# Patient Record
Sex: MALE | Race: WHITE | Employment: FULL TIME | URBAN - METROPOLITAN AREA
[De-identification: names, ages, dates, MRNs, and addresses within clinical notes are randomized per-mention and may not be internally consistent; named-entity substitution may affect disease eponyms.]

---

## 2022-01-28 ENCOUNTER — TRANSCRIBE ORDER (OUTPATIENT)
Dept: SCHEDULING | Age: 52
End: 2022-01-28

## 2022-01-28 DIAGNOSIS — M54.14 THORACIC RADICULOPATHY: Primary | ICD-10-CM

## 2022-01-28 DIAGNOSIS — M62.81 MUSCLE WEAKNESS (GENERALIZED): ICD-10-CM

## 2022-02-17 ENCOUNTER — HOSPITAL ENCOUNTER (OUTPATIENT)
Dept: MRI IMAGING | Age: 52
Discharge: HOME | End: 2022-02-17
Attending: FAMILY MEDICINE
Payer: COMMERCIAL

## 2022-02-17 DIAGNOSIS — M62.81 MUSCLE WEAKNESS (GENERALIZED): ICD-10-CM

## 2022-02-17 DIAGNOSIS — M54.14 THORACIC RADICULOPATHY: ICD-10-CM

## 2022-02-17 PROCEDURE — 72141 MRI NECK SPINE W/O DYE: CPT

## 2024-02-07 ENCOUNTER — APPOINTMENT (OUTPATIENT)
Facility: HOSPITAL | Age: 54
End: 2024-02-07

## 2024-02-07 ENCOUNTER — HOSPITAL ENCOUNTER (OUTPATIENT)
Facility: HOSPITAL | Age: 54
Setting detail: OBSERVATION
Discharge: HOME | End: 2024-02-07
Attending: STUDENT IN AN ORGANIZED HEALTH CARE EDUCATION/TRAINING PROGRAM | Admitting: FAMILY MEDICINE

## 2024-02-07 VITALS
BODY MASS INDEX: 22.82 KG/M2 | TEMPERATURE: 98.1 F | OXYGEN SATURATION: 97 % | HEIGHT: 71 IN | RESPIRATION RATE: 13 BRPM | SYSTOLIC BLOOD PRESSURE: 137 MMHG | DIASTOLIC BLOOD PRESSURE: 80 MMHG | HEART RATE: 73 BPM | WEIGHT: 163 LBS

## 2024-02-07 DIAGNOSIS — R79.89 ELEVATED TROPONIN: ICD-10-CM

## 2024-02-07 DIAGNOSIS — I20.89 OTHER FORMS OF ANGINA PECTORIS: Primary | ICD-10-CM

## 2024-02-07 DIAGNOSIS — I21.4 NSTEMI (NON-ST ELEVATED MYOCARDIAL INFARCTION) (HCC): ICD-10-CM

## 2024-02-07 DIAGNOSIS — I15.8 OTHER SECONDARY HYPERTENSION: ICD-10-CM

## 2024-02-07 PROBLEM — R07.9 CHEST PAIN: Status: ACTIVE | Noted: 2024-02-07

## 2024-02-07 LAB
ANION GAP SERPL CALC-SCNC: 4 MMOL/L (ref 5–15)
BASOPHILS # BLD: 0 K/UL (ref 0–0.1)
BASOPHILS NFR BLD: 1 % (ref 0–1)
BUN SERPL-MCNC: 11 MG/DL (ref 6–20)
BUN/CREAT SERPL: 8 (ref 12–20)
CALCIUM SERPL-MCNC: 9.5 MG/DL (ref 8.5–10.1)
CHLORIDE SERPL-SCNC: 107 MMOL/L (ref 97–108)
CO2 SERPL-SCNC: 26 MMOL/L (ref 21–32)
COMMENT:: NORMAL
CREAT SERPL-MCNC: 1.31 MG/DL (ref 0.7–1.3)
DIFFERENTIAL METHOD BLD: NORMAL
ECHO BSA: 1.92 M2
EOSINOPHIL # BLD: 0 K/UL (ref 0–0.4)
EOSINOPHIL NFR BLD: 1 % (ref 0–7)
ERYTHROCYTE [DISTWIDTH] IN BLOOD BY AUTOMATED COUNT: 12.9 % (ref 11.5–14.5)
GLUCOSE SERPL-MCNC: 93 MG/DL (ref 65–100)
HCT VFR BLD AUTO: 41.9 % (ref 36.6–50.3)
HGB BLD-MCNC: 14.5 G/DL (ref 12.1–17)
IMM GRANULOCYTES # BLD AUTO: 0 K/UL (ref 0–0.04)
IMM GRANULOCYTES NFR BLD AUTO: 0 % (ref 0–0.5)
LYMPHOCYTES # BLD: 2 K/UL (ref 0.8–3.5)
LYMPHOCYTES NFR BLD: 32 % (ref 12–49)
MCH RBC QN AUTO: 31.2 PG (ref 26–34)
MCHC RBC AUTO-ENTMCNC: 34.6 G/DL (ref 30–36.5)
MCV RBC AUTO: 90.1 FL (ref 80–99)
MONOCYTES # BLD: 0.5 K/UL (ref 0–1)
MONOCYTES NFR BLD: 8 % (ref 5–13)
NEUTS SEG # BLD: 3.7 K/UL (ref 1.8–8)
NEUTS SEG NFR BLD: 58 % (ref 32–75)
NRBC # BLD: 0 K/UL (ref 0–0.01)
NRBC BLD-RTO: 0 PER 100 WBC
PLATELET # BLD AUTO: 217 K/UL (ref 150–400)
PMV BLD AUTO: 11.9 FL (ref 8.9–12.9)
POTASSIUM SERPL-SCNC: 5.1 MMOL/L (ref 3.5–5.1)
RBC # BLD AUTO: 4.65 M/UL (ref 4.1–5.7)
SODIUM SERPL-SCNC: 137 MMOL/L (ref 136–145)
SPECIMEN HOLD: NORMAL
TROPONIN I SERPL HS-MCNC: 105 NG/L (ref 0–76)
TROPONIN I SERPL HS-MCNC: 37 NG/L (ref 0–76)
WBC # BLD AUTO: 6.3 K/UL (ref 4.1–11.1)

## 2024-02-07 PROCEDURE — 80048 BASIC METABOLIC PNL TOTAL CA: CPT

## 2024-02-07 PROCEDURE — 99285 EMERGENCY DEPT VISIT HI MDM: CPT

## 2024-02-07 PROCEDURE — 2580000003 HC RX 258: Performed by: INTERNAL MEDICINE

## 2024-02-07 PROCEDURE — 96375 TX/PRO/DX INJ NEW DRUG ADDON: CPT

## 2024-02-07 PROCEDURE — 6360000002 HC RX W HCPCS: Performed by: STUDENT IN AN ORGANIZED HEALTH CARE EDUCATION/TRAINING PROGRAM

## 2024-02-07 PROCEDURE — 71045 X-RAY EXAM CHEST 1 VIEW: CPT

## 2024-02-07 PROCEDURE — 99152 MOD SED SAME PHYS/QHP 5/>YRS: CPT | Performed by: INTERNAL MEDICINE

## 2024-02-07 PROCEDURE — 96374 THER/PROPH/DIAG INJ IV PUSH: CPT

## 2024-02-07 PROCEDURE — 36415 COLL VENOUS BLD VENIPUNCTURE: CPT

## 2024-02-07 PROCEDURE — G0378 HOSPITAL OBSERVATION PER HR: HCPCS

## 2024-02-07 PROCEDURE — 93454 CORONARY ARTERY ANGIO S&I: CPT | Performed by: INTERNAL MEDICINE

## 2024-02-07 PROCEDURE — 6360000004 HC RX CONTRAST MEDICATION: Performed by: INTERNAL MEDICINE

## 2024-02-07 PROCEDURE — 84484 ASSAY OF TROPONIN QUANT: CPT

## 2024-02-07 PROCEDURE — C1713 ANCHOR/SCREW BN/BN,TIS/BN: HCPCS | Performed by: INTERNAL MEDICINE

## 2024-02-07 PROCEDURE — 2500000003 HC RX 250 WO HCPCS: Performed by: INTERNAL MEDICINE

## 2024-02-07 PROCEDURE — 2709999900 HC NON-CHARGEABLE SUPPLY: Performed by: INTERNAL MEDICINE

## 2024-02-07 PROCEDURE — C1894 INTRO/SHEATH, NON-LASER: HCPCS | Performed by: INTERNAL MEDICINE

## 2024-02-07 PROCEDURE — 93005 ELECTROCARDIOGRAM TRACING: CPT | Performed by: STUDENT IN AN ORGANIZED HEALTH CARE EDUCATION/TRAINING PROGRAM

## 2024-02-07 PROCEDURE — 85025 COMPLETE CBC W/AUTO DIFF WBC: CPT

## 2024-02-07 PROCEDURE — 6360000002 HC RX W HCPCS: Performed by: INTERNAL MEDICINE

## 2024-02-07 RX ORDER — VERAPAMIL HYDROCHLORIDE 2.5 MG/ML
INJECTION, SOLUTION INTRAVENOUS PRN
Status: DISCONTINUED | OUTPATIENT
Start: 2024-02-07 | End: 2024-02-07 | Stop reason: HOSPADM

## 2024-02-07 RX ORDER — ONDANSETRON 4 MG/1
4 TABLET, ORALLY DISINTEGRATING ORAL EVERY 8 HOURS PRN
Status: DISCONTINUED | OUTPATIENT
Start: 2024-02-07 | End: 2024-02-07 | Stop reason: HOSPADM

## 2024-02-07 RX ORDER — KETOROLAC TROMETHAMINE 30 MG/ML
30 INJECTION, SOLUTION INTRAMUSCULAR; INTRAVENOUS ONCE
Status: COMPLETED | OUTPATIENT
Start: 2024-02-07 | End: 2024-02-07

## 2024-02-07 RX ORDER — HEPARIN SODIUM 1000 [USP'U]/ML
60 INJECTION, SOLUTION INTRAVENOUS; SUBCUTANEOUS ONCE
Status: DISCONTINUED | OUTPATIENT
Start: 2024-02-07 | End: 2024-02-07 | Stop reason: HOSPADM

## 2024-02-07 RX ORDER — MORPHINE SULFATE 2 MG/ML
2 INJECTION, SOLUTION INTRAMUSCULAR; INTRAVENOUS EVERY 4 HOURS PRN
Status: DISCONTINUED | OUTPATIENT
Start: 2024-02-07 | End: 2024-02-07 | Stop reason: HOSPADM

## 2024-02-07 RX ORDER — OMEPRAZOLE 10 MG/1
10 CAPSULE, DELAYED RELEASE ORAL
COMMUNITY

## 2024-02-07 RX ORDER — MIDAZOLAM HYDROCHLORIDE 1 MG/ML
INJECTION INTRAMUSCULAR; INTRAVENOUS PRN
Status: DISCONTINUED | OUTPATIENT
Start: 2024-02-07 | End: 2024-02-07 | Stop reason: HOSPADM

## 2024-02-07 RX ORDER — HEPARIN SODIUM 1000 [USP'U]/ML
INJECTION, SOLUTION INTRAVENOUS; SUBCUTANEOUS PRN
Status: DISCONTINUED | OUTPATIENT
Start: 2024-02-07 | End: 2024-02-07 | Stop reason: HOSPADM

## 2024-02-07 RX ORDER — SODIUM CHLORIDE 0.9 % (FLUSH) 0.9 %
5-40 SYRINGE (ML) INJECTION EVERY 12 HOURS SCHEDULED
Status: DISCONTINUED | OUTPATIENT
Start: 2024-02-07 | End: 2024-02-07 | Stop reason: HOSPADM

## 2024-02-07 RX ORDER — ACETAMINOPHEN 650 MG/1
650 SUPPOSITORY RECTAL EVERY 6 HOURS PRN
Status: DISCONTINUED | OUTPATIENT
Start: 2024-02-07 | End: 2024-02-07 | Stop reason: HOSPADM

## 2024-02-07 RX ORDER — SODIUM CHLORIDE 9 MG/ML
INJECTION, SOLUTION INTRAVENOUS CONTINUOUS PRN
Status: COMPLETED | OUTPATIENT
Start: 2024-02-07 | End: 2024-02-07

## 2024-02-07 RX ORDER — POTASSIUM CHLORIDE 750 MG/1
40 TABLET, FILM COATED, EXTENDED RELEASE ORAL PRN
Status: DISCONTINUED | OUTPATIENT
Start: 2024-02-07 | End: 2024-02-07 | Stop reason: HOSPADM

## 2024-02-07 RX ORDER — POTASSIUM CHLORIDE 7.45 MG/ML
10 INJECTION INTRAVENOUS PRN
Status: DISCONTINUED | OUTPATIENT
Start: 2024-02-07 | End: 2024-02-07 | Stop reason: HOSPADM

## 2024-02-07 RX ORDER — HEPARIN SODIUM 10000 [USP'U]/100ML
5-30 INJECTION, SOLUTION INTRAVENOUS CONTINUOUS
Status: DISCONTINUED | OUTPATIENT
Start: 2024-02-07 | End: 2024-02-07

## 2024-02-07 RX ORDER — HEPARIN SODIUM 200 [USP'U]/100ML
INJECTION, SOLUTION INTRAVENOUS CONTINUOUS PRN
Status: COMPLETED | OUTPATIENT
Start: 2024-02-07 | End: 2024-02-07

## 2024-02-07 RX ORDER — SODIUM CHLORIDE 9 MG/ML
INJECTION, SOLUTION INTRAVENOUS PRN
Status: DISCONTINUED | OUTPATIENT
Start: 2024-02-07 | End: 2024-02-07 | Stop reason: HOSPADM

## 2024-02-07 RX ORDER — MORPHINE SULFATE 4 MG/ML
4 INJECTION, SOLUTION INTRAMUSCULAR; INTRAVENOUS
Status: COMPLETED | OUTPATIENT
Start: 2024-02-07 | End: 2024-02-07

## 2024-02-07 RX ORDER — FENTANYL CITRATE 50 UG/ML
INJECTION, SOLUTION INTRAMUSCULAR; INTRAVENOUS PRN
Status: DISCONTINUED | OUTPATIENT
Start: 2024-02-07 | End: 2024-02-07 | Stop reason: HOSPADM

## 2024-02-07 RX ORDER — POLYETHYLENE GLYCOL 3350 17 G/17G
17 POWDER, FOR SOLUTION ORAL DAILY PRN
Status: DISCONTINUED | OUTPATIENT
Start: 2024-02-07 | End: 2024-02-07 | Stop reason: HOSPADM

## 2024-02-07 RX ORDER — NICOTINE 21 MG/24HR
1 PATCH, TRANSDERMAL 24 HOURS TRANSDERMAL DAILY
Status: DISCONTINUED | OUTPATIENT
Start: 2024-02-07 | End: 2024-02-07 | Stop reason: HOSPADM

## 2024-02-07 RX ORDER — ONDANSETRON 2 MG/ML
4 INJECTION INTRAMUSCULAR; INTRAVENOUS EVERY 6 HOURS PRN
Status: DISCONTINUED | OUTPATIENT
Start: 2024-02-07 | End: 2024-02-07 | Stop reason: HOSPADM

## 2024-02-07 RX ORDER — MAGNESIUM SULFATE IN WATER 40 MG/ML
2000 INJECTION, SOLUTION INTRAVENOUS PRN
Status: DISCONTINUED | OUTPATIENT
Start: 2024-02-07 | End: 2024-02-07 | Stop reason: HOSPADM

## 2024-02-07 RX ORDER — ACETAMINOPHEN 325 MG/1
650 TABLET ORAL EVERY 6 HOURS PRN
Status: DISCONTINUED | OUTPATIENT
Start: 2024-02-07 | End: 2024-02-07 | Stop reason: HOSPADM

## 2024-02-07 RX ORDER — HEPARIN SODIUM 1000 [USP'U]/ML
30 INJECTION, SOLUTION INTRAVENOUS; SUBCUTANEOUS PRN
Status: DISCONTINUED | OUTPATIENT
Start: 2024-02-07 | End: 2024-02-07

## 2024-02-07 RX ORDER — ASPIRIN 81 MG/1
243 TABLET, CHEWABLE ORAL ONCE
Status: DISCONTINUED | OUTPATIENT
Start: 2024-02-07 | End: 2024-02-07 | Stop reason: HOSPADM

## 2024-02-07 RX ORDER — ACETAMINOPHEN 325 MG/1
650 TABLET ORAL EVERY 4 HOURS PRN
Status: DISCONTINUED | OUTPATIENT
Start: 2024-02-07 | End: 2024-02-07 | Stop reason: HOSPADM

## 2024-02-07 RX ORDER — SODIUM CHLORIDE 0.9 % (FLUSH) 0.9 %
5-40 SYRINGE (ML) INJECTION PRN
Status: DISCONTINUED | OUTPATIENT
Start: 2024-02-07 | End: 2024-02-07 | Stop reason: HOSPADM

## 2024-02-07 RX ORDER — LIDOCAINE HYDROCHLORIDE 10 MG/ML
INJECTION, SOLUTION INFILTRATION; PERINEURAL PRN
Status: DISCONTINUED | OUTPATIENT
Start: 2024-02-07 | End: 2024-02-07 | Stop reason: HOSPADM

## 2024-02-07 RX ORDER — HEPARIN SODIUM 1000 [USP'U]/ML
60 INJECTION, SOLUTION INTRAVENOUS; SUBCUTANEOUS PRN
Status: DISCONTINUED | OUTPATIENT
Start: 2024-02-07 | End: 2024-02-07

## 2024-02-07 RX ADMIN — KETOROLAC TROMETHAMINE 30 MG: 30 INJECTION INTRAMUSCULAR; INTRAVENOUS at 08:51

## 2024-02-07 RX ADMIN — MORPHINE SULFATE 4 MG: 4 INJECTION, SOLUTION INTRAMUSCULAR; INTRAVENOUS at 08:50

## 2024-02-07 ASSESSMENT — PAIN SCALES - GENERAL
PAINLEVEL_OUTOF10: 4
PAINLEVEL_OUTOF10: 10
PAINLEVEL_OUTOF10: 6
PAINLEVEL_OUTOF10: 10
PAINLEVEL_OUTOF10: 3
PAINLEVEL_OUTOF10: 10

## 2024-02-07 ASSESSMENT — PAIN - FUNCTIONAL ASSESSMENT
PAIN_FUNCTIONAL_ASSESSMENT: 0-10
PAIN_FUNCTIONAL_ASSESSMENT: PREVENTS OR INTERFERES SOME ACTIVE ACTIVITIES AND ADLS

## 2024-02-07 ASSESSMENT — PAIN DESCRIPTION - LOCATION
LOCATION: CHEST

## 2024-02-07 ASSESSMENT — PAIN DESCRIPTION - ORIENTATION
ORIENTATION: LEFT;UPPER;ANTERIOR
ORIENTATION: LEFT;UPPER;ANTERIOR
ORIENTATION: LEFT

## 2024-02-07 ASSESSMENT — LIFESTYLE VARIABLES
HOW OFTEN DO YOU HAVE A DRINK CONTAINING ALCOHOL: 2-3 TIMES A WEEK
HOW MANY STANDARD DRINKS CONTAINING ALCOHOL DO YOU HAVE ON A TYPICAL DAY: 1 OR 2
HOW OFTEN DO YOU HAVE A DRINK CONTAINING ALCOHOL: 2-3 TIMES A WEEK
HOW MANY STANDARD DRINKS CONTAINING ALCOHOL DO YOU HAVE ON A TYPICAL DAY: 1 OR 2

## 2024-02-07 ASSESSMENT — PAIN DESCRIPTION - PAIN TYPE
TYPE: ACUTE PAIN
TYPE: ACUTE PAIN

## 2024-02-07 ASSESSMENT — PAIN DESCRIPTION - FREQUENCY
FREQUENCY: INTERMITTENT
FREQUENCY: INTERMITTENT

## 2024-02-07 ASSESSMENT — ENCOUNTER SYMPTOMS
NAUSEA: 0
RHINORRHEA: 0
VOMITING: 0
ABDOMINAL PAIN: 0
TROUBLE SWALLOWING: 0
SHORTNESS OF BREATH: 0
BACK PAIN: 0

## 2024-02-07 ASSESSMENT — PAIN DESCRIPTION - DESCRIPTORS
DESCRIPTORS: SHARP
DESCRIPTORS: STABBING
DESCRIPTORS: STABBING

## 2024-02-07 NOTE — H&P
History and Physical    Date of Service:  2/7/2024  Primary Care Provider: Miguelito Hung MD  Source of information: patient, electronic medical record    Chief Complaint: Chest Pain (Left upper)      History of Presenting Illness:   Avery Mccartney is a 53 y.o. male with a past medical history significant for pneumothorax and subsequent episodes of pleurisy.  Patient presented to the emergency department at our facility on 2/7/2024 with complaints of chest pain which started in the morning.  This was described as sharp stabbing pain, constant, associated with diaphoresis.  Workup in the emergency department included initial troponin of 37 however follow-up was elevated at 105.  EKG did not reveal any ischemic changes, chest radiograph was unremarkable.  Patient was ordered to the hospitalist service for further evaluation and consult was placed to cardiology.  At the moment of the initial interview, with some discomfort however has received morphine.  Vital signs stable, 117/81 with heart rate of 66 variations 15.  Saturation is 96% on room air           REVIEW OF SYSTEMS:  A comprehensive review of systems was negative except for that written in the History of Present Illness.     Past Medical History:   Diagnosis Date    GERD (gastroesophageal reflux disease)     Heat stroke 2010    Left groin pain 9/4/2012    Pleurisy 2002    Pneumothorax on right 2004    Psychiatric disorder 1982    ADHD    Recurrent left inguinal hernia 8/28/2012    Seizures (HCC)       Past Surgical History:   Procedure Laterality Date    HERNIA REPAIR  2012    ORTHOPEDIC SURGERY  2012    RIGHT MCL REPAIR     Prior to Admission medications    Medication Sig Start Date End Date Taking? Authorizing Provider   omeprazole (PRILOSEC) 10 MG delayed release capsule Take 1 capsule by mouth nightly GERD   Yes Provider, Raul, MD     Allergies   Allergen Reactions    Hydrocodone Nausea And Vomiting      Family History   Problem Relation

## 2024-02-07 NOTE — PROGRESS NOTES
Admission Medication Reconciliation:    Information obtained from:  Patient  RxQuery data available¹:  Yes    Comments/Recommendations: Updated PTA meds/reviewed patient's allergies.    1)  Patient reports only taking OTC omeprazole 10 mg QHS. Last dose was last night 2/06.    2)  Medication changes (since last review): None    3)  Confirmed allergies. N/V to hydrocodone.     ¹RxQuery pharmacy benefit data reflects medications filled and processed through the patient's insurance, however   this data does NOT capture whether the medication was picked up or is currently being taken by the patient.    Allergies:  Hydrocodone    Significant PMH/Disease States:   Past Medical History:   Diagnosis Date    GERD (gastroesophageal reflux disease)     Heat stroke 2010    Left groin pain 9/4/2012    Pleurisy 2002    Pneumothorax on right 2004    Psychiatric disorder 1982    ADHD    Recurrent left inguinal hernia 8/28/2012    Seizures (HCC)      Chief Complaint for this Admission:    Chief Complaint   Patient presents with    Chest Pain     Left upper     Prior to Admission Medications:   Prior to Admission Medications   Prescriptions Last Dose Informant   omeprazole (PRILOSEC) 10 MG delayed release capsule 2/6/2024    Sig: Take 1 capsule by mouth nightly GERD      Facility-Administered Medications: None     Please contact the main inpatient pharmacy with any questions or concerns at [phone] and we will direct you to the clinical pharmacist covering this patient's care while in-house.   Sanket Chacon, PharmD  Clinical Pharmacist   done

## 2024-02-07 NOTE — ED TRIAGE NOTES
BIBA coming from [facility] in [place] where the patient works.     C/O CP - left upper area, intermittent 4-10/10, stabbing pain, with SOB when pain hits - per patient. Originally diaphoretic when EMS arrived.       Denies nausea or vomiting, flu sx.    A&O x 4, ambulates well,    Hx per chart: seizures( pt states, was shaking with cold) GERD, psychiatric disorder, skin cancer right ear canal. Pleurisy,   Recurrent L. Inguinal hernia.

## 2024-02-07 NOTE — PROGRESS NOTES
TRANSFER - IN REPORT:    Verbal report received from REGI De Dios on Avery Mccartney  being received from [facility] for routine progression of care. Report consisted of patient’s Situation, Background, Assessment and Recommendations(SBAR). Information from the following report(s) procedure summary, MAR was reviewed with the receiving clinician. Opportunity for questions and clarification was provided. Assessment completed upon patient’s arrival to Cardiac Cath Lab RECOVERY AREA and care assumed.       Cardiac Cath Lab Recovery Arrival Note:    Avery Mccartney arrived to [facility] recovery area.  Patient procedure= LHC. Patient on cardiac monitor, non-invasive blood pressure, SPO2 monitor. On RA.  IV  of NS on pump at 100 ml/hr. Patient status doing well without problems. Patient is A&Ox 4. Patient reports no complaints.    PROCEDURE SITE CHECK:    Procedure site:Right radial without any bleeding and TR band @ 12, zero pain/discomfort reported at procedure site.     No change in patient status. Continue to monitor patient and status.    1345: Boxed lunch provided.

## 2024-02-07 NOTE — PROGRESS NOTES
Cardiac Cath Lab Recovery Arrival Note:      Avery Mccartney arrived to Cardiac Cath Lab, Recovery Area. Staff introduced to patient. Patient identifiers verified with NAME and DATE OF BIRTH. Procedure verified with patient. Consent forms reviewed and signed by patient or authorized representative and verified. Allergies verified.     Patient and family oriented to department. Patient and family informed of procedure and plan of care.     Questions answered with review. Patient prepped for procedure, per orders from physician, prior to arrival.    Patient on cardiac monitor, non-invasive blood pressure, SPO2 monitor. On RA. Patient is A&Ox 4. Patient reports mild chest pain 4/10.     Patient in stretcher, in low position, with side rails up, call bell within reach, patient instructed to call if assistance as needed.    Patient prep in: [facility] Recovery Area, [place] [address].   Patient family has pager #   Family in: .   Prep by: Lalita DELUNA

## 2024-02-07 NOTE — PROGRESS NOTES
Cardiac Cath Lab Procedure Area Arrival Note:    Avery Mccartney arrived to Cardiac Cath Lab, Procedure Area. Patient identifiers verified with NAME and DATE OF BIRTH. Procedure verified with patient. Consent forms verified. Allergies verified. Patient informed of procedure and plan of care. Questions answered with review. Patient voiced understanding of procedure and plan of care.    Patient on cardiac monitor, non-invasive blood pressure, SPO2 monitor. On RA and placed on O2 @ 2 lpm via NC.  IV of NS on pump at 50 ml/hr. Patient status doing well without problems. Patient is A&Ox 4. Patient reports no CP and no SOB.     Patient medicated during procedure with orders obtained and verified by Dr. Angelo.    Refer to patients Cardiac Cath Lab PROCEDURE REPORT for vital signs, assessment, status, and response during procedure, printed at end of case. Printed report on chart or scanned into chart. '      1332 CATH LAB to RECOVERY ROOM REPORT    Procedure: [facility]    MD: DILIA Angelo MD    The procedure was diagnostic only.    Verbal Report given to Recovery Nurse on patient being transferred to Cardiac Cath Lab RR for routine post-op. Patient stable upon transfer to .  Vitals, mental status, MAR, procedural summary discussed with recovery RN.    Medication given during procedure:    Contrast:39mL                          Heparin:5,000units     Versed:2mg                                                               Fentanyl:25mcg                                                             Sheaths:    Right radial sheath pulled at 1328 , band at 12mL of air

## 2024-02-07 NOTE — ED PROVIDER NOTES
HPI    53 y.o. male presenting with chest pain.  History of recurrent chest pain in the context of what was described as pleurisy.  It is primarily left-sided pain.  He had a history of a distant pneumothorax on the right after which she developed left-sided chest syndrome.  He denies fevers, chills.  He has not had cough recently.  The pain started this morning around 4 AM and has been constant, sharp and stabbing in left side exclusively with radiation to the left shoulder.  Denies neck radiation.  Did have inappropriate diaphoresis early in his syndrome as well as shortness of breath.  He denies leg swelling, pleuritic pain.  In the past he has had pleuritic pain associated with pleurisy.  Avery Mccartney   has a past medical history of GERD (gastroesophageal reflux disease), Heat stroke, Left groin pain, Pleurisy, Pneumothorax on right, Psychiatric disorder, Recurrent left inguinal hernia, and Seizures ([facility]).       Physical Exam  Vitals reviewed.   Constitutional:       General: He is not in acute distress.     Appearance: Normal appearance.   HENT:      Head: Normocephalic.      Mouth/Throat:      Mouth: Mucous membranes are moist.   Eyes:      Extraocular Movements: Extraocular movements intact.      Pupils: Pupils are equal, round, and reactive to light.   Cardiovascular:      Rate and Rhythm: Normal rate and regular rhythm.      Pulses: Normal pulses.   Pulmonary:      Effort: Pulmonary effort is normal. No respiratory distress.      Breath sounds: Normal breath sounds. No wheezing.   Abdominal:      General: Abdomen is flat.      Tenderness: There is no abdominal tenderness. There is no right CVA tenderness or left CVA tenderness.   Musculoskeletal:      Cervical back: Neck supple. No rigidity.      Right lower leg: No edema.      Left lower leg: No edema.   Skin:     General: Skin is warm.      Capillary Refill: Capillary refill takes less than 2 seconds.   Neurological:      General: No focal deficit

## 2024-02-07 NOTE — H&P
History and Physical    Date of Service:  2/7/2024  Primary Care Provider: Miguelito Hung MD  Source of information: patient    Chief Complaint: Chest Pain (Left upper)      History of Presenting Illness:   Avery Mccartney is a 53 y.o. male with a past medical hx of GERD, Heat Stroke, recurrent Pleurisy s/p Right Pneumothorax (2004), recurrent left inguinal hernia with left groin pain, seizures who presents to the ER for sharp stabbing left sternal chest pain that began this morning at 0530 am with diaphoresis.     The patient denies any headache, blurry vision, sore throat, trouble swallowing, trouble with speech, chest pain, SOB, cough, fever, chills, N/V/D, abd pain, urinary symptoms, constipation, recent travels, sick contacts, focal or generalized neurological symptoms, falls, injuries, rashes, contact with COVID-19 diagnosed patients, hematemesis, melena, hemoptysis, hematuria, rashes, denies any medication use and denies any other concerns or problems besides as mentioned above. Endorses tobacco use for 40 years with 10 cigarettes a day.       Initial hs-troponin and ECG wrkup normal.  Repeat hs-troponin significantly elevated  Cardiology consulted for Acute Coronary Syndrome            REVIEW OF SYSTEMS:  Pertinent items are noted in the History of Present Illness.     Past Medical History:   Diagnosis Date    GERD (gastroesophageal reflux disease)     Heat stroke 2010    Left groin pain 9/4/2012    Pleurisy 2002    Pneumothorax on right 2004    Psychiatric disorder 1982    ADHD    Recurrent left inguinal hernia 8/28/2012    Seizures (HCC)       Past Surgical History:   Procedure Laterality Date    HERNIA REPAIR  2012    ORTHOPEDIC SURGERY  2012    RIGHT MCL REPAIR     Prior to Admission medications    Medication Sig Start Date End Date Taking? Authorizing Provider   omeprazole (PRILOSEC) 10 MG delayed release capsule Take 1 capsule by mouth nightly GERD   Yes Provider, MD Raul     Allergies

## 2024-02-07 NOTE — CONSULTS
packs/day: 0.50     Types: Cigarettes    Smokeless tobacco: Never    Tobacco comments:     Quit smokin CIGS PER DAY  SMOKER X 32YEARS   Substance and Sexual Activity    Alcohol use: Yes     Alcohol/week: 5.0 standard drinks of alcohol     Types: 4 Cans of beer, 1 Standard drinks or equivalent per week    Drug use: No    Sexual activity: Not on file   Other Topics Concern    Not on file   Social History Narrative    Not on file     Social Determinants of Health     Financial Resource Strain: Not on file   Food Insecurity: Not on file   Transportation Needs: Not on file   Physical Activity: Not on file   Stress: Not on file   Social Connections: Not on file   Intimate Partner Violence: Not on file   Housing Stability: Not on file       Review of Systems   Constitutional:  Positive for diaphoresis. Negative for fever.   HENT:  Negative for rhinorrhea and trouble swallowing.    Respiratory:  Negative for shortness of breath.    Cardiovascular:  Positive for chest pain. Negative for palpitations and leg swelling.   Gastrointestinal:  Negative for abdominal pain, nausea and vomiting.   Genitourinary:  Negative for frequency and urgency.   Musculoskeletal:  Negative for back pain and myalgias.   Skin:  Negative for rash and wound.   Neurological:  Negative for dizziness and weakness.   Psychiatric/Behavioral:  Negative for confusion and hallucinations.          Vitals:    24 1100   BP:    Pulse: 85   Resp: 27   Temp:    SpO2: 96%     Physical Exam  Vitals reviewed.   Constitutional:       General: He is not in acute distress.  HENT:      Head: Normocephalic.   Eyes:      Conjunctiva/sclera: Conjunctivae normal.   Cardiovascular:      Rate and Rhythm: Normal rate and regular rhythm.      Heart sounds: Normal heart sounds. No murmur heard.  Pulmonary:      Effort: Pulmonary effort is normal. No respiratory distress.      Breath sounds: Normal breath sounds.   Abdominal:      General: Abdomen is flat.

## 2024-02-07 NOTE — DISCHARGE SUMMARY
Family Medicine Schedule an appointment as soon as possible for a visit   [address]  [facility] [address]  [phone]                ADDITIONAL CARE RECOMMENDATIONS:     DIET: regular diet      ACTIVITY: activity as tolerated    WOUND CARE: na    EQUIPMENT needed: na      DISCHARGE MEDICATIONS:     Medication List        CONTINUE taking these medications      omeprazole 10 MG delayed release capsule  Commonly known as: PRILOSEC                NOTIFY YOUR PHYSICIAN FOR ANY OF THE FOLLOWING:   Fever over 101 degrees for 24 hours.   Chest pain, shortness of breath, fever, chills, nausea, vomiting, diarrhea, change in mentation, falling, weakness, bleeding. Severe pain or pain not relieved by medications.  Or, any other signs or symptoms that you may have questions about.    DISPOSITION:   x Home With:   OT  PT  HH  RN       Long term SNF/Inpatient Rehab    Independent/assisted living    Hospice    Other:       PATIENT CONDITION AT DISCHARGE:     Functional status    Poor     Deconditioned    x Independent      Cognition    x Lucid     Forgetful     Dementia      Catheters/lines (plus indication)    [place]     PICC     PEG    x None      Code status    x Full code     DNR      PHYSICAL EXAMINATION AT DISCHARGE:    General : alert x 3, awake, no acute distress,   HEENT: PEERL, EOMI, moist mucus membrane  Neck: supple, no JVD, no meningeal signs  Chest: Clear to auscultation bilaterally   CVS: S1 S2 heard, Capillary refill less than 2 seconds  Abd: soft/ Non tender, non distended, BS physiological,   Ext: no clubbing, no cyanosis, no edema, brisk 2+ DP pulses  Neuro/Psych: pleasant mood and affect, CN 2-12 grossly intact, sensory grossly within normal limit, Strength 5/5 in all extremities  Skin: warm     CHRONIC MEDICAL DIAGNOSES:  Principal Problem:    Chest pain  Resolved Problems:    * No resolved hospital problems. *        Greater than 31 minutes were spent with the patient on counseling and

## 2024-02-07 NOTE — PROCEDURES
Cardiac Catheterization Procedure Note   Patient: Avery Mccartney  MRN: [id number]  SSN: [id number]   YOB: 1970 Age: 53 y.o.  Sex: male    Date of Procedure: 2/7/2024   Pre-procedure Diagnosis: Suspected NSTEMI  Post-procedure Diagnosis: Non-cardiac Chest Pain  Procedure: Left Heart Cath  :  Dr. Luisito Angelo MD    Assistant(s):  None  Anesthesia: Moderate Sedation   Estimated Blood Loss: Less than 10 mL   Specimens Removed: None    Access: Right radial artery    Findings:   Left main: normal  LAD: normal  Lcx: normal  RCA: minimal non-obstructive disease.    No culprit lesion identified.  NSTEMI ruled out  Cause of chest pain unclear.     Can be discharged later today from CV perspective.    Complications: None   Implants:  None  Signed by:  Luisito Angelo MD  2/7/2024  1:31 PM

## 2024-02-07 NOTE — PROGRESS NOTES
1545: Ambulated patient within the department with a steady gait, voided without difficulty. Patient denies chest pain, shortness of breath, or dizziness. [facility] Vital signs stable.     Procedural site is clean, dry, and intact. No bleeding, no hematoma.     Patient dressed self.     Educated patient about their sedation precautions such as not driving, operating any machinery, or signing legal documents 24 hours post procedure.     Reviewed discharge instructions, including medications and site care using the teach back method.  Answered all questions. Verbalized understanding.     1550: Removed peripheral IV.    Escorted to discharge area in a wheelchair with all of their belongings including cell phone    1600: Patient's spouse present to take patient home. Reviewed discharge instructions with patients' spouse, they verbalized understanding.

## 2024-02-08 LAB
EKG ATRIAL RATE: 67 BPM
EKG DIAGNOSIS: NORMAL
EKG P AXIS: 83 DEGREES
EKG P-R INTERVAL: 162 MS
EKG Q-T INTERVAL: 386 MS
EKG QRS DURATION: 82 MS
EKG QTC CALCULATION (BAZETT): 407 MS
EKG R AXIS: 85 DEGREES
EKG T AXIS: 83 DEGREES
EKG VENTRICULAR RATE: 67 BPM

## 2024-02-08 PROCEDURE — 93010 ELECTROCARDIOGRAM REPORT: CPT | Performed by: SPECIALIST

## 2024-02-15 ENCOUNTER — HOSPITAL ENCOUNTER (INPATIENT)
Facility: HOSPITAL | Age: 54
LOS: 1 days | Discharge: HOME | End: 2024-02-16
Attending: EMERGENCY MEDICINE | Admitting: INTERNAL MEDICINE

## 2024-02-15 ENCOUNTER — APPOINTMENT (OUTPATIENT)
Facility: HOSPITAL | Age: 54
End: 2024-02-15

## 2024-02-15 DIAGNOSIS — I21.3 STEMI (ST ELEVATION MYOCARDIAL INFARCTION) (HCC): ICD-10-CM

## 2024-02-15 DIAGNOSIS — I21.3 ST ELEVATION MYOCARDIAL INFARCTION (STEMI), UNSPECIFIED ARTERY (HCC): Primary | ICD-10-CM

## 2024-02-15 DIAGNOSIS — Z95.5 STENTED CORONARY ARTERY: Primary | ICD-10-CM

## 2024-02-15 LAB
ALBUMIN SERPL-MCNC: 4 G/DL (ref 3.5–5)
ALBUMIN/GLOB SERPL: 1.1 (ref 1.1–2.2)
ALP SERPL-CCNC: 74 U/L (ref 45–117)
ALT SERPL-CCNC: 33 U/L (ref 12–78)
ANION GAP SERPL CALC-SCNC: 7 MMOL/L (ref 5–15)
AST SERPL-CCNC: 30 U/L (ref 15–37)
BASOPHILS # BLD: 0.1 K/UL (ref 0–0.1)
BASOPHILS NFR BLD: 1 % (ref 0–1)
BILIRUB SERPL-MCNC: 0.4 MG/DL (ref 0.2–1)
BUN SERPL-MCNC: 9 MG/DL (ref 6–20)
BUN/CREAT SERPL: 6 (ref 12–20)
CALCIUM SERPL-MCNC: 9.5 MG/DL (ref 8.5–10.1)
CHLORIDE SERPL-SCNC: 105 MMOL/L (ref 97–108)
CHOLEST SERPL-MCNC: 221 MG/DL
CO2 SERPL-SCNC: 23 MMOL/L (ref 21–32)
COMMENT:: NORMAL
CREAT SERPL-MCNC: 1.43 MG/DL (ref 0.7–1.3)
DIFFERENTIAL METHOD BLD: ABNORMAL
EKG ATRIAL RATE: 62 BPM
EKG ATRIAL RATE: 63 BPM
EKG ATRIAL RATE: 63 BPM
EKG ATRIAL RATE: 68 BPM
EKG DIAGNOSIS: NORMAL
EKG P AXIS: 63 DEGREES
EKG P AXIS: 67 DEGREES
EKG P AXIS: 74 DEGREES
EKG P AXIS: 74 DEGREES
EKG P-R INTERVAL: 152 MS
EKG P-R INTERVAL: 162 MS
EKG P-R INTERVAL: 164 MS
EKG P-R INTERVAL: 182 MS
EKG Q-T INTERVAL: 390 MS
EKG Q-T INTERVAL: 390 MS
EKG Q-T INTERVAL: 394 MS
EKG Q-T INTERVAL: 394 MS
EKG QRS DURATION: 76 MS
EKG QRS DURATION: 78 MS
EKG QRS DURATION: 78 MS
EKG QRS DURATION: 84 MS
EKG QTC CALCULATION (BAZETT): 395 MS
EKG QTC CALCULATION (BAZETT): 403 MS
EKG QTC CALCULATION (BAZETT): 403 MS
EKG QTC CALCULATION (BAZETT): 414 MS
EKG R AXIS: 42 DEGREES
EKG R AXIS: 62 DEGREES
EKG R AXIS: 70 DEGREES
EKG R AXIS: 78 DEGREES
EKG T AXIS: 37 DEGREES
EKG T AXIS: 48 DEGREES
EKG T AXIS: 62 DEGREES
EKG T AXIS: 75 DEGREES
EKG VENTRICULAR RATE: 62 BPM
EKG VENTRICULAR RATE: 63 BPM
EKG VENTRICULAR RATE: 63 BPM
EKG VENTRICULAR RATE: 68 BPM
EOSINOPHIL # BLD: 0.1 K/UL (ref 0–0.4)
EOSINOPHIL NFR BLD: 1 % (ref 0–7)
ERYTHROCYTE [DISTWIDTH] IN BLOOD BY AUTOMATED COUNT: 12.8 % (ref 11.5–14.5)
GLOBULIN SER CALC-MCNC: 3.6 G/DL (ref 2–4)
GLUCOSE BLD STRIP.AUTO-MCNC: 110 MG/DL (ref 65–117)
GLUCOSE SERPL-MCNC: 116 MG/DL (ref 65–100)
HCT VFR BLD AUTO: 43.9 % (ref 36.6–50.3)
HDLC SERPL-MCNC: 47 MG/DL
HDLC SERPL: 4.7 (ref 0–5)
HGB BLD-MCNC: 14.8 G/DL (ref 12.1–17)
IMM GRANULOCYTES # BLD AUTO: 0 K/UL (ref 0–0.04)
IMM GRANULOCYTES NFR BLD AUTO: 0 % (ref 0–0.5)
LDLC SERPL CALC-MCNC: 145.6 MG/DL (ref 0–100)
LYMPHOCYTES # BLD: 3.9 K/UL (ref 0.8–3.5)
LYMPHOCYTES NFR BLD: 41 % (ref 12–49)
MCH RBC QN AUTO: 31.4 PG (ref 26–34)
MCHC RBC AUTO-ENTMCNC: 33.7 G/DL (ref 30–36.5)
MCV RBC AUTO: 93 FL (ref 80–99)
MONOCYTES # BLD: 1 K/UL (ref 0–1)
MONOCYTES NFR BLD: 11 % (ref 5–13)
NEUTS SEG # BLD: 4.4 K/UL (ref 1.8–8)
NEUTS SEG NFR BLD: 46 % (ref 32–75)
NRBC # BLD: 0 K/UL (ref 0–0.01)
NRBC BLD-RTO: 0 PER 100 WBC
PLATELET # BLD AUTO: 240 K/UL (ref 150–400)
PMV BLD AUTO: 11.8 FL (ref 8.9–12.9)
POTASSIUM SERPL-SCNC: 3.7 MMOL/L (ref 3.5–5.1)
PROT SERPL-MCNC: 7.6 G/DL (ref 6.4–8.2)
RBC # BLD AUTO: 4.72 M/UL (ref 4.1–5.7)
SERVICE CMNT-IMP: NORMAL
SODIUM SERPL-SCNC: 135 MMOL/L (ref 136–145)
SPECIMEN HOLD: NORMAL
TRIGL SERPL-MCNC: 142 MG/DL
TROPONIN I SERPL HS-MCNC: 30 NG/L (ref 0–76)
VLDLC SERPL CALC-MCNC: 28.4 MG/DL
WBC # BLD AUTO: 9.5 K/UL (ref 4.1–11.1)

## 2024-02-15 PROCEDURE — 82962 GLUCOSE BLOOD TEST: CPT

## 2024-02-15 PROCEDURE — 96374 THER/PROPH/DIAG INJ IV PUSH: CPT

## 2024-02-15 PROCEDURE — C1725 CATH, TRANSLUMIN NON-LASER: HCPCS | Performed by: INTERNAL MEDICINE

## 2024-02-15 PROCEDURE — 2709999900 HC NON-CHARGEABLE SUPPLY: Performed by: INTERNAL MEDICINE

## 2024-02-15 PROCEDURE — 85025 COMPLETE CBC W/AUTO DIFF WBC: CPT

## 2024-02-15 PROCEDURE — 96375 TX/PRO/DX INJ NEW DRUG ADDON: CPT

## 2024-02-15 PROCEDURE — 6360000002 HC RX W HCPCS: Performed by: EMERGENCY MEDICINE

## 2024-02-15 PROCEDURE — C9601 PERC DRUG-EL COR STENT BRAN: HCPCS | Performed by: INTERNAL MEDICINE

## 2024-02-15 PROCEDURE — 93454 CORONARY ARTERY ANGIO S&I: CPT | Performed by: INTERNAL MEDICINE

## 2024-02-15 PROCEDURE — C1887 CATHETER, GUIDING: HCPCS | Performed by: INTERNAL MEDICINE

## 2024-02-15 PROCEDURE — C1894 INTRO/SHEATH, NON-LASER: HCPCS | Performed by: INTERNAL MEDICINE

## 2024-02-15 PROCEDURE — 6360000002 HC RX W HCPCS: Performed by: INTERNAL MEDICINE

## 2024-02-15 PROCEDURE — 93005 ELECTROCARDIOGRAM TRACING: CPT | Performed by: INTERNAL MEDICINE

## 2024-02-15 PROCEDURE — 4A023N7 MEASUREMENT OF CARDIAC SAMPLING AND PRESSURE, LEFT HEART, PERCUTANEOUS APPROACH: ICD-10-PCS | Performed by: INTERNAL MEDICINE

## 2024-02-15 PROCEDURE — C9606 PERC D-E COR REVASC W AMI S: HCPCS | Performed by: INTERNAL MEDICINE

## 2024-02-15 PROCEDURE — 80061 LIPID PANEL: CPT

## 2024-02-15 PROCEDURE — 99153 MOD SED SAME PHYS/QHP EA: CPT | Performed by: INTERNAL MEDICINE

## 2024-02-15 PROCEDURE — 99284 EMERGENCY DEPT VISIT MOD MDM: CPT

## 2024-02-15 PROCEDURE — C1713 ANCHOR/SCREW BN/BN,TIS/BN: HCPCS | Performed by: INTERNAL MEDICINE

## 2024-02-15 PROCEDURE — C1874 STENT, COATED/COV W/DEL SYS: HCPCS | Performed by: INTERNAL MEDICINE

## 2024-02-15 PROCEDURE — 6360000002 HC RX W HCPCS

## 2024-02-15 PROCEDURE — 92978 ENDOLUMINL IVUS OCT C 1ST: CPT | Performed by: INTERNAL MEDICINE

## 2024-02-15 PROCEDURE — 6370000000 HC RX 637 (ALT 250 FOR IP): Performed by: INTERNAL MEDICINE

## 2024-02-15 PROCEDURE — 0270356 DILATION OF CORONARY ARTERY, ONE ARTERY, BIFURCATION, WITH TWO DRUG-ELUTING INTRALUMINAL DEVICES, PERCUTANEOUS APPROACH: ICD-10-PCS | Performed by: INTERNAL MEDICINE

## 2024-02-15 PROCEDURE — B2111ZZ FLUOROSCOPY OF MULTIPLE CORONARY ARTERIES USING LOW OSMOLAR CONTRAST: ICD-10-PCS | Performed by: INTERNAL MEDICINE

## 2024-02-15 PROCEDURE — 2580000003 HC RX 258: Performed by: INTERNAL MEDICINE

## 2024-02-15 PROCEDURE — 99152 MOD SED SAME PHYS/QHP 5/>YRS: CPT | Performed by: INTERNAL MEDICINE

## 2024-02-15 PROCEDURE — 2060000000 HC ICU INTERMEDIATE R&B

## 2024-02-15 PROCEDURE — B240ZZ3 ULTRASONOGRAPHY OF SINGLE CORONARY ARTERY, INTRAVASCULAR: ICD-10-PCS | Performed by: INTERNAL MEDICINE

## 2024-02-15 PROCEDURE — 93005 ELECTROCARDIOGRAM TRACING: CPT | Performed by: EMERGENCY MEDICINE

## 2024-02-15 PROCEDURE — 36415 COLL VENOUS BLD VENIPUNCTURE: CPT

## 2024-02-15 PROCEDURE — C1769 GUIDE WIRE: HCPCS | Performed by: INTERNAL MEDICINE

## 2024-02-15 PROCEDURE — 80053 COMPREHEN METABOLIC PANEL: CPT

## 2024-02-15 PROCEDURE — 84484 ASSAY OF TROPONIN QUANT: CPT

## 2024-02-15 PROCEDURE — 6360000004 HC RX CONTRAST MEDICATION: Performed by: INTERNAL MEDICINE

## 2024-02-15 PROCEDURE — C1753 CATH, INTRAVAS ULTRASOUND: HCPCS | Performed by: INTERNAL MEDICINE

## 2024-02-15 DEVICE — STENT ONYXNG27512UX ONYX 2.75X12RX
Type: IMPLANTABLE DEVICE | Status: FUNCTIONAL
Brand: ONYX FRONTIER™

## 2024-02-15 DEVICE — STENT CORONARY ONYX FRONTIER RX 3.5X15 MM ZOTAROLIMUS ELUT: Type: IMPLANTABLE DEVICE | Status: FUNCTIONAL

## 2024-02-15 RX ORDER — ONDANSETRON 2 MG/ML
4 INJECTION INTRAMUSCULAR; INTRAVENOUS EVERY 6 HOURS PRN
Status: DISCONTINUED | OUTPATIENT
Start: 2024-02-15 | End: 2024-02-16 | Stop reason: HOSPADM

## 2024-02-15 RX ORDER — ROSUVASTATIN CALCIUM 40 MG/1
40 TABLET, COATED ORAL DAILY
Status: DISCONTINUED | OUTPATIENT
Start: 2024-02-15 | End: 2024-02-16 | Stop reason: HOSPADM

## 2024-02-15 RX ORDER — SODIUM CHLORIDE 9 MG/ML
INJECTION, SOLUTION INTRAVENOUS PRN
Status: DISCONTINUED | OUTPATIENT
Start: 2024-02-15 | End: 2024-02-16 | Stop reason: HOSPADM

## 2024-02-15 RX ORDER — SODIUM CHLORIDE 0.9 % (FLUSH) 0.9 %
5-40 SYRINGE (ML) INJECTION EVERY 12 HOURS SCHEDULED
Status: DISCONTINUED | OUTPATIENT
Start: 2024-02-15 | End: 2024-02-16 | Stop reason: HOSPADM

## 2024-02-15 RX ORDER — HEPARIN SODIUM 200 [USP'U]/100ML
INJECTION, SOLUTION INTRAVENOUS CONTINUOUS PRN
Status: COMPLETED | OUTPATIENT
Start: 2024-02-15 | End: 2024-02-15

## 2024-02-15 RX ORDER — FENTANYL CITRATE 50 UG/ML
INJECTION, SOLUTION INTRAMUSCULAR; INTRAVENOUS PRN
Status: DISCONTINUED | OUTPATIENT
Start: 2024-02-15 | End: 2024-02-15 | Stop reason: HOSPADM

## 2024-02-15 RX ORDER — BIVALIRUDIN 250 MG/5ML
INJECTION, POWDER, LYOPHILIZED, FOR SOLUTION INTRAVENOUS PRN
Status: DISCONTINUED | OUTPATIENT
Start: 2024-02-15 | End: 2024-02-15 | Stop reason: HOSPADM

## 2024-02-15 RX ORDER — HEPARIN SODIUM 5000 [USP'U]/ML
5000 INJECTION, SOLUTION INTRAVENOUS; SUBCUTANEOUS ONCE
Status: DISCONTINUED | OUTPATIENT
Start: 2024-02-15 | End: 2024-02-15

## 2024-02-15 RX ORDER — ACETAMINOPHEN 325 MG/1
650 TABLET ORAL EVERY 4 HOURS PRN
Status: DISCONTINUED | OUTPATIENT
Start: 2024-02-15 | End: 2024-02-16 | Stop reason: HOSPADM

## 2024-02-15 RX ORDER — MIDAZOLAM HYDROCHLORIDE 1 MG/ML
INJECTION INTRAMUSCULAR; INTRAVENOUS PRN
Status: DISCONTINUED | OUTPATIENT
Start: 2024-02-15 | End: 2024-02-15 | Stop reason: HOSPADM

## 2024-02-15 RX ORDER — SODIUM CHLORIDE 9 MG/ML
INJECTION, SOLUTION INTRAVENOUS CONTINUOUS PRN
Status: COMPLETED | OUTPATIENT
Start: 2024-02-15 | End: 2024-02-15

## 2024-02-15 RX ORDER — ZOLPIDEM TARTRATE 5 MG/1
5 TABLET ORAL NIGHTLY PRN
Status: DISCONTINUED | OUTPATIENT
Start: 2024-02-15 | End: 2024-02-16 | Stop reason: HOSPADM

## 2024-02-15 RX ORDER — HEPARIN SODIUM 5000 [USP'U]/ML
INJECTION, SOLUTION INTRAVENOUS; SUBCUTANEOUS
Status: COMPLETED
Start: 2024-02-15 | End: 2024-02-15

## 2024-02-15 RX ORDER — SODIUM CHLORIDE 0.9 % (FLUSH) 0.9 %
5-40 SYRINGE (ML) INJECTION PRN
Status: DISCONTINUED | OUTPATIENT
Start: 2024-02-15 | End: 2024-02-16 | Stop reason: HOSPADM

## 2024-02-15 RX ORDER — MORPHINE SULFATE 2 MG/ML
2 INJECTION, SOLUTION INTRAMUSCULAR; INTRAVENOUS EVERY 4 HOURS PRN
Status: DISCONTINUED | OUTPATIENT
Start: 2024-02-15 | End: 2024-02-16 | Stop reason: HOSPADM

## 2024-02-15 RX ORDER — HEPARIN SODIUM 1000 [USP'U]/ML
5000 INJECTION, SOLUTION INTRAVENOUS; SUBCUTANEOUS ONCE
Status: DISCONTINUED | OUTPATIENT
Start: 2024-02-15 | End: 2024-02-16 | Stop reason: HOSPADM

## 2024-02-15 RX ORDER — ASPIRIN 81 MG/1
81 TABLET ORAL DAILY
Status: DISCONTINUED | OUTPATIENT
Start: 2024-02-16 | End: 2024-02-16 | Stop reason: HOSPADM

## 2024-02-15 RX ADMIN — TICAGRELOR 90 MG: 90 TABLET ORAL at 20:04

## 2024-02-15 RX ADMIN — ZOLPIDEM TARTRATE 5 MG: 5 TABLET ORAL at 20:04

## 2024-02-15 RX ADMIN — MORPHINE SULFATE 2 MG: 2 INJECTION, SOLUTION INTRAMUSCULAR; INTRAVENOUS at 10:23

## 2024-02-15 RX ADMIN — METOPROLOL TARTRATE 12.5 MG: 25 TABLET, FILM COATED ORAL at 20:05

## 2024-02-15 RX ADMIN — ROSUVASTATIN CALCIUM 40 MG: 40 TABLET, COATED ORAL at 15:23

## 2024-02-15 RX ADMIN — SODIUM CHLORIDE, PRESERVATIVE FREE 10 ML: 5 INJECTION INTRAVENOUS at 20:02

## 2024-02-15 RX ADMIN — ONDANSETRON 4 MG: 2 INJECTION INTRAMUSCULAR; INTRAVENOUS at 20:14

## 2024-02-15 RX ADMIN — ACETAMINOPHEN 650 MG: 325 TABLET ORAL at 14:46

## 2024-02-15 RX ADMIN — METOPROLOL TARTRATE 12.5 MG: 25 TABLET, FILM COATED ORAL at 12:53

## 2024-02-15 RX ADMIN — ONDANSETRON 4 MG: 2 INJECTION INTRAMUSCULAR; INTRAVENOUS at 10:23

## 2024-02-15 RX ADMIN — HEPARIN SODIUM 5000 UNITS: 5000 INJECTION INTRAVENOUS; SUBCUTANEOUS at 10:41

## 2024-02-15 ASSESSMENT — PAIN - FUNCTIONAL ASSESSMENT
PAIN_FUNCTIONAL_ASSESSMENT: ACTIVITIES ARE NOT PREVENTED
PAIN_FUNCTIONAL_ASSESSMENT: NONE - DENIES PAIN

## 2024-02-15 ASSESSMENT — PAIN SCALES - GENERAL
PAINLEVEL_OUTOF10: 4
PAINLEVEL_OUTOF10: 0
PAINLEVEL_OUTOF10: 8
PAINLEVEL_OUTOF10: 0

## 2024-02-15 ASSESSMENT — LIFESTYLE VARIABLES
HOW MANY STANDARD DRINKS CONTAINING ALCOHOL DO YOU HAVE ON A TYPICAL DAY: 1 OR 2
HOW OFTEN DO YOU HAVE A DRINK CONTAINING ALCOHOL: MONTHLY OR LESS

## 2024-02-15 ASSESSMENT — PAIN DESCRIPTION - LOCATION
LOCATION: CHEST
LOCATION: WRIST

## 2024-02-15 ASSESSMENT — PAIN DESCRIPTION - ORIENTATION
ORIENTATION: LEFT
ORIENTATION: RIGHT

## 2024-02-15 ASSESSMENT — PAIN DESCRIPTION - DESCRIPTORS
DESCRIPTORS: THROBBING
DESCRIPTORS: PRESSURE

## 2024-02-15 NOTE — PROGRESS NOTES
Cardiac Cath Lab Procedure Area Arrival Note:    Avery Mccartney arrived to Cardiac Cath Lab, Procedure Area. Patient identifiers verified with NAME and DATE OF BIRTH. Procedure verified with patient. Consent forms verified. Allergies verified. Patient informed of procedure and plan of care. Questions answered with review. Patient voiced understanding of procedure and plan of care.    Patient on cardiac monitor, non-invasive blood pressure, SPO2 monitor. On RA and placed on O2 @ 2 lpm via NC.  IV of NS on pump at 100 ml/hr. Patient status doing well without problems. Patient is A&Ox 4. Patient reports 8/10 Chest Pain and no difficulty breathing.     Patient medicated during procedure with orders obtained and verified by Dr. Angelo.    Refer to patients Cardiac Cath Lab PROCEDURE REPORT for vital signs, assessment, status, and response during procedure, printed at end of case. Printed report on chart or scanned into chart.

## 2024-02-15 NOTE — ED PROVIDER NOTES
[facility] CARDIAC CATH/EP LAB  EMERGENCY DEPARTMENT ENCOUNTER      Pt Name: Avery Mccartney  MRN: [id number]  Birthdate 1970  Date of evaluation: 2/15/2024  Provider: Victor Manuel Estrada MD    CHIEF COMPLAINT       Chief Complaint   Patient presents with    Chest Pain         HISTORY OF PRESENT ILLNESS    HPI      Review of External Medical Records:     Nursing Notes were reviewed.    REVIEW OF SYSTEMS       Review of Systems   Constitutional:  Negative for activity change, chills, fatigue and fever.   HENT:  Negative for congestion, ear pain, rhinorrhea, sinus pressure, sinus pain, sore throat and trouble swallowing.    Eyes: Negative.    Respiratory:  Positive for shortness of breath. Negative for cough, chest tightness, wheezing and stridor.    Cardiovascular:  Positive for chest pain. Negative for palpitations and leg swelling.   Gastrointestinal:  Negative for abdominal pain, blood in stool, diarrhea, nausea and vomiting.   Endocrine: Negative.    Genitourinary:  Negative for decreased urine volume, difficulty urinating, flank pain, frequency and hematuria.   Musculoskeletal:  Negative for back pain, joint swelling and neck pain.   Skin:  Negative for color change, pallor and rash.   Neurological:  Negative for dizziness, syncope, speech difficulty, weakness, numbness and headaches.   Psychiatric/Behavioral:  Negative for agitation and behavioral problems.              PAST MEDICAL HISTORY     Past Medical History:   Diagnosis Date    GERD (gastroesophageal reflux disease)     Heat stroke 2010    Left groin pain 9/4/2012    Pleurisy 2002    Pneumothorax on right 2004    Psychiatric disorder 1982    ADHD    Recurrent left inguinal hernia 8/28/2012    Seizures (HCC)          SURGICAL HISTORY       Past Surgical History:   Procedure Laterality Date    CARDIAC PROCEDURE N/A 2/7/2024    Coronary angiography performed by Luisito Angelo MD at [facility] CARDIAC CATH LAB    HERNIA REPAIR  2012    ORTHOPEDIC  Conjunctiva/sclera: Conjunctivae normal.      Pupils: Pupils are equal, round, and reactive to light.   Neck:      Vascular: No carotid bruit.   Cardiovascular:      Rate and Rhythm: Normal rate and regular rhythm.      Pulses: Normal pulses.      Heart sounds: Normal heart sounds.   Pulmonary:      Effort: Pulmonary effort is normal. No respiratory distress.      Breath sounds: Normal breath sounds. No wheezing, rhonchi or rales.   Abdominal:      General: Abdomen is flat. Bowel sounds are normal.      Palpations: Abdomen is soft.      Tenderness: There is no abdominal tenderness. There is no guarding.   Musculoskeletal:         General: No swelling or tenderness.      Cervical back: Normal range of motion. No rigidity or tenderness.   Skin:     General: Skin is warm and dry.      Capillary Refill: Capillary refill takes 2 to 3 seconds.      Coloration: Skin is not pale.   Neurological:      General: No focal deficit present.      Mental Status: He is alert and oriented to person, place, and time.      Cranial Nerves: No cranial nerve deficit.      Sensory: No sensory deficit.      Motor: No weakness.      Coordination: Coordination normal.   Psychiatric:         Behavior: Behavior normal.         DIAGNOSTIC RESULTS     EKG:   [facility] EKG interpretation: Is normal sinus rhythm at 74 beats a minute.  No definitive acute ST changes are noted.  Axis is normal and intervals are normal.  Nonspecific ST and T wave changes noted.Victor Manuel Estrada MD     EKG done several minutes later, demonstrates normal sinus rhythm with what now looks like some ST elevation in 1 aVL with some possible reciprocal changes.  Cannot rule out acute ischemia.  Tracing appears to be different than the first tracing.Victor Manuel Estrada MD      RADIOLOGY:           CTA CHEST W WO CONTRAST    (Results Pending)        LABS:  Labs Reviewed   CBC WITH AUTO DIFFERENTIAL - Abnormal; Notable for the following components:       Result Value    Lymphocytes

## 2024-02-15 NOTE — ED TRIAGE NOTES
Triage note: pt arrives via EMS as STEMI alert. Pt had sudden onset CP. Recent cath by Dr Angelo 1 week ago.     Stemi cancelled by Dr Angelo.    324mg ASA and 1 Nitrotab give PTA by EMS , improved pain.

## 2024-02-15 NOTE — PROGRESS NOTES
TRANSFER - IN REPORT:    Verbal report received from REGI Uriostegui on Avery Mccartney  being received from cath lab for routine post-op. Report consisted of patient’s Situation, Background, Assessment and Recommendations(SBAR). Information from the following report(s) SBAR, Procedure Summary, Intake/Output, MAR, Recent Results, Med Rec Status, and Cardiac Rhythm NSR  was reviewed with the receiving clinician. Opportunity for questions and clarification was provided. Assessment completed upon patient’s arrival to Cardiac Cath Lab RECOVERY AREA and care assumed.       Cardiac Cath Lab Recovery Arrival Note:    Avery Mccartney arrived to [facility] recovery area.  Patient procedure= C stent to LAD and 1st diag. Patient on cardiac monitor, non-invasive blood pressure, SPO2 monitor. On  O2 @ 2 lpm via NC.  IV  of angiomax  on pump at 25.4 ml/hr. Patient status doing well without problems, no fever, normal elimination, normal activities. Patient is A&Ox 4. Patient reports no complaints.    PROCEDURE SITE CHECK:    Procedure site:with a small amount of bleeding and small hematoma, pressure held, TR band repositioned, no further bleeding, no hematoma, 12ml air in band, no pain/discomfort reported at procedure site.     No change in patient status. Continue to monitor patient and status.     Ekg completed    Dr. Angelo at bedside.    Family at bedside    1300- Lunch given, tolerated PO well.      1315-TRANSFER - OUT REPORT:    Verbal report given to REGI Pratt on Avery Mccartney  being transferred to [facility] for routine progression of patient care       Report consisted of patient's Situation, Background, Assessment and   Recommendations(SBAR).     Information from the following report(s) Nurse Handoff Report, Surgery Report, Intake/Output, MAR, Recent Results, and Cardiac Rhythm NSR  was reviewed with the receiving nurse.           Lines:   Peripheral IV 02/15/24 Distal;Right;Anterior Cephalic (Active)   Site Assessment Clean, dry &

## 2024-02-15 NOTE — PROCEDURES
Cardiac Catheterization Procedure Note   Patient: Avery Mccartney  MRN: [id number]  SSN: [id number]   YOB: 1970 Age: 54 y.o.  Sex: male    Date of Procedure: 2/15/2024   Pre-procedure Diagnosis: STEMI (lateral)  Post-procedure Diagnosis: Coronary Artery Disease  Procedure: Left Heart Cath and PCI, IVUS  :  Dr. Luisito Angelo MD    Assistant(s):  None  Anesthesia: Moderate Sedation   Estimated Blood Loss: Less than 10 mL   Specimens Removed: None    Access: Right radial artery    Catheters used: EBU 3.5 guide for the PCI, JR4 diagnostic catheter.    Findings:   Sub-total 99% occlusion of a large branching D1 branch at the ostium with RADHA 1 flow.    PCI of the LAD D1 bifurcation performed    Anticoagulation:  Angiomax  Lesion crossing:  The lesion in the D1 was crossed initially with a Runthrough wire.     PTCA of the D1 was performed using a 2.5mm balloon at the ostium and proximal D1 initially to restore antegrade flow.   IVUS of the D1 branch was then performed for vessel sizing and lesion characterization. The D1 branch was 2.75 to 3.0mm in diameter and had severe focal ostial disease. There appeared to be moderate plaque in the LAD main branch as well on pull back.     The LAD was then protected with a Prowater wire and IVUS of the LAD was performed. The LAD was patent with reference size of 3.5mm and it appeared to have moderate plaque with approximately 50% stenosis at the bifurcation.    A [id number] SEAN implanted in the ostial/proximal D1 branch, with a 3.5mm balloon positioned in the LAD. After D1 stent deployement, the side-branch stent balloon was removed and the LAD balloon was inflated to crush the ostium of the stent.     The D1 branch was then re-wired and PTCA of the ostium was performed.    Since there was moderate disease in the LAD at this site, it was felt necessary to stent the LAD as well, so a 3.5x15mm SEAN implanted in the prox LAD across the D1 branch. The D1 wire was

## 2024-02-15 NOTE — CARDIO/PULMONARY
Chart reviewed: Patient is 54 y.o. male admitted with STEMI (ST elevation myocardial infarction) (HCC) [I21.3]    Patient is s/p SEAN x 2 to LAD-D1 bifurcation. [facility] cardiac rehab contact information placed on the AVS and referral initiated. Will follow for enrollment.   VERA PALENCIA RN

## 2024-02-15 NOTE — PROGRESS NOTES
CATH LAB to RECOVERY ROOM REPORT    Procedure: PCI with Stent Placement    MD: DILIA Angelo MD    The procedure was an intervention with 2 stent(s) placed to the LAD and Diag..    Verbal Report given to Recovery Nurse on patient being transferred to Cardiac Cath Lab  for routine post-op. Patient stable upon transfer to .  Vitals, mental status, MAR, procedural summary discussed with recovery RN.    Medication given during procedure:    Contrast:123mL                          Versed:2mg                                                               Fentanyl:50mcg                                                           Brilinta:180mg    Angiomax running 25.4ml/hr.  Stop drip 1300    Sheaths:    Right radial sheath pulled at 1205 am, band at 12mL of air in TR band

## 2024-02-15 NOTE — CONSULTS
[facility] Cardiology Consultation    Date of Consult:  02/15/24  Date of Admission: 2/15/2024  Primary Cardiologist: Dr. Angelo  Physician Requesting consult: Dr. Estrada    Assessment/Recommendations:  Chest pain, ECG  does not meet criteria for STEMI but concerning for acute occlusive MI - subtle ST elevation I, aVL and hyperacute T wave V4-6  - mild non obstructive CAD per cath 2/7/24 but given persistent chest pain and ECG findings, STEMI alert reactivated and plan for emergent cath with Dr. Angelo.    2. Tobacco abuse      Thank you for the opportunity to participate in the care of Avery Mccartney and please do not hesitate to contact us should you have any questions.      Chief Complaint / Reason for Consult:   Chest pain    History of Present Illness:  Avery Mccartney is a 54 y.o. male with the below listed medical history who presented with chest pain. Had indigestion like symptoms this morning but then started noticing severe left sided chest pain with dyspnea, nausea and diaphoresis.    ECG on arrival noted NSR with < 1 mm ST elevation, I, aVL and V4-6. Subsequent ECG with hyperacute T waves V4-6 and < 1 mm ST elevation I and aVL.    Still with chest pain after NTG and morphine.    Past Medical History:   Diagnosis Date    GERD (gastroesophageal reflux disease)     Heat stroke 2010    Left groin pain 9/4/2012    Pleurisy 2002    Pneumothorax on right 2004    Psychiatric disorder 1982    ADHD    Recurrent left inguinal hernia 8/28/2012    Seizures (HCC)        Prior to Admission medications    Medication Sig Start Date End Date Taking? Authorizing Provider   omeprazole (PRILOSEC) 10 MG delayed release capsule Take 1 capsule by mouth nightly GERD    Provider, MD Raul       Current Facility-Administered Medications   Medication Dose Route Frequency    morphine (PF) injection 2 mg  2 mg IntraVENous Q4H PRN    ondansetron (ZOFRAN) injection 4 mg  4 mg IntraVENous Q6H PRN    iopamidol (ISOVUE-370) 76 % injection

## 2024-02-16 ENCOUNTER — APPOINTMENT (OUTPATIENT)
Facility: HOSPITAL | Age: 54
End: 2024-02-16
Attending: INTERNAL MEDICINE

## 2024-02-16 VITALS
HEIGHT: 72 IN | BODY MASS INDEX: 20.31 KG/M2 | HEART RATE: 66 BPM | RESPIRATION RATE: 13 BRPM | OXYGEN SATURATION: 96 % | DIASTOLIC BLOOD PRESSURE: 78 MMHG | TEMPERATURE: 97.9 F | WEIGHT: 149.91 LBS | SYSTOLIC BLOOD PRESSURE: 111 MMHG

## 2024-02-16 LAB
ECHO AV PEAK GRADIENT: 4 MMHG
ECHO AV PEAK VELOCITY: 1 M/S
ECHO AV VELOCITY RATIO: 0.9
ECHO BSA: 1.87 M2
ECHO LA VOL A-L A4C: 39 ML (ref 18–58)
ECHO LA VOL MOD A4C: 34 ML (ref 18–58)
ECHO LA VOLUME INDEX A-L A4C: 21 ML/M2 (ref 16–34)
ECHO LA VOLUME INDEX MOD A4C: 18 ML/M2 (ref 16–34)
ECHO LV E' LATERAL VELOCITY: 11 CM/S
ECHO LV E' SEPTAL VELOCITY: 7 CM/S
ECHO LV EDV A2C: 88 ML
ECHO LV EDV A4C: 92 ML
ECHO LV EDV BP: 92 ML (ref 67–155)
ECHO LV EDV INDEX A4C: 49 ML/M2
ECHO LV EDV INDEX BP: 49 ML/M2
ECHO LV EDV NDEX A2C: 47 ML/M2
ECHO LV EJECTION FRACTION A2C: 52 %
ECHO LV EJECTION FRACTION A4C: 54 %
ECHO LV EJECTION FRACTION BIPLANE: 54 % (ref 55–100)
ECHO LV ESV A2C: 42 ML
ECHO LV ESV A4C: 42 ML
ECHO LV ESV BP: 42 ML (ref 22–58)
ECHO LV ESV INDEX A2C: 22 ML/M2
ECHO LV ESV INDEX A4C: 22 ML/M2
ECHO LV ESV INDEX BP: 22 ML/M2
ECHO LV FRACTIONAL SHORTENING: 30 % (ref 28–44)
ECHO LV INTERNAL DIMENSION DIASTOLE INDEX: 2.29 CM/M2
ECHO LV INTERNAL DIMENSION DIASTOLIC: 4.3 CM (ref 4.2–5.9)
ECHO LV INTERNAL DIMENSION SYSTOLIC INDEX: 1.6 CM/M2
ECHO LV INTERNAL DIMENSION SYSTOLIC: 3 CM
ECHO LV IVSD: 0.4 CM (ref 0.6–1)
ECHO LV MASS 2D: 65.5 G (ref 88–224)
ECHO LV MASS INDEX 2D: 34.8 G/M2 (ref 49–115)
ECHO LV POSTERIOR WALL DIASTOLIC: 0.7 CM (ref 0.6–1)
ECHO LV RELATIVE WALL THICKNESS RATIO: 0.33
ECHO LVOT PEAK GRADIENT: 3 MMHG
ECHO LVOT PEAK VELOCITY: 0.9 M/S
ECHO MV A VELOCITY: 0.49 M/S
ECHO MV E VELOCITY: 0.53 M/S
ECHO MV E/A RATIO: 1.08
ECHO MV E/E' LATERAL: 4.82
ECHO MV E/E' RATIO (AVERAGED): 6.19
ECHO RV INTERNAL DIMENSION: 3.2 CM

## 2024-02-16 PROCEDURE — 93306 TTE W/DOPPLER COMPLETE: CPT

## 2024-02-16 PROCEDURE — 6370000000 HC RX 637 (ALT 250 FOR IP): Performed by: INTERNAL MEDICINE

## 2024-02-16 RX ORDER — ASPIRIN 81 MG/1
81 TABLET ORAL DAILY
Qty: 90 TABLET | Refills: 3 | Status: SHIPPED | OUTPATIENT
Start: 2024-02-16

## 2024-02-16 RX ORDER — ROSUVASTATIN CALCIUM 40 MG/1
40 TABLET, COATED ORAL DAILY
Qty: 90 TABLET | Refills: 3 | Status: SHIPPED | OUTPATIENT
Start: 2024-02-16

## 2024-02-16 RX ADMIN — ROSUVASTATIN CALCIUM 40 MG: 40 TABLET, COATED ORAL at 08:52

## 2024-02-16 RX ADMIN — METOPROLOL TARTRATE 12.5 MG: 25 TABLET, FILM COATED ORAL at 08:52

## 2024-02-16 RX ADMIN — ASPIRIN 81 MG: 81 TABLET, COATED ORAL at 08:52

## 2024-02-16 RX ADMIN — TICAGRELOR 90 MG: 90 TABLET ORAL at 08:52

## 2024-02-16 ASSESSMENT — PAIN SCALES - GENERAL
PAINLEVEL_OUTOF10: 0
PAINLEVEL_OUTOF10: 0

## 2024-02-16 NOTE — DISCHARGE SUMMARY
Discharge Summary    Date: 2/16/2024  Patient Name: Avery Mccartney    YOB: 1970     Age: 54 y.o.    Admit Date: 2/15/2024  Discharge Date:  Discharge Condition: Good    Admission Diagnosis  STEMI (ST elevation myocardial infarction) ([facility]) [I21.3]      Discharge Diagnosis  Principal Problem:    STEMI (ST elevation myocardial infarction) ([facility])  Resolved Problems:    * No resolved hospital problems. *      Hospital Stay  Narrative of Hospital Course:  Admitted with acute chest pain and found to have lateral WESLEY on EKG.  He was taken emergently to the cath lab where he was found to have sub-total occlusion of the D1 branch off the LAD.  PCI was performed with an excellent final result  He did well clinically overnight and has had no further chest pain.  Started on appropriate post MI meds including ASA and Brilinta for DAPT.    Consultants:  IP CONSULT TO CARDIAC REHAB  IP CONSULT TO CARDIAC REHAB    Surgeries/procedures Performed:  LHC, PCI     Treatments:            Discharge Plan/Disposition:  Home    Hospital/Incidental Findings Requiring Follow Up:    Patient Instructions:    Diet: Cardiac Diet    Activity:Activity as Tolerated  For number of days (if applicable):      Other Instructions:    Provider Follow-Up:   No follow-ups on file.     Significant Diagnostic Studies:    Recent Labs:  Admission on 02/15/2024  Ventricular Rate                              Date: 02/15/2024  Value: 68          Ref range: BPM                Status: Final-Edited  Atrial Rate                                   Date: 02/15/2024  Value: 68          Ref range: BPM                Status: Final-Edited  P-R Interval                                  Date: 02/15/2024  Value: 152         Ref range: ms                 Status: Final-Edited  QRS Duration                                  Date: 02/15/2024  Value: 78          Ref range: ms                 Status: Final-Edited  Q-T Interval                                  Date:  range: ms                 Status: Final  P Axis                                        Date: 02/15/2024  Value: 74          Ref range: degrees            Status: Final  R Axis                                        Date: 02/15/2024  Value: 62          Ref range: degrees            Status: Final  T Axis                                        Date: 02/15/2024  Value: 75          Ref range: degrees            Status: Final  Diagnosis                                     Date: 02/15/2024  Value:               Status: Final                   Value:Normal sinus rhythm  Nonspecific ST abnormality  Abnormal ECG  When compared with ECG of 15-FEB-2024 10:26,  Anterior infarct is now present  ST more elevated in Lateral leads  Nonspecific T wave abnormality no longer evident in Lateral leads  Confirmed by Hermes Hernandez MD ([id number]) on 2/15/2024 3:28:16 PM    POC Glucose                                   Date: 02/15/2024  Value: 110         Ref range: 65 - 117 mg/dL     Status: Final                Comment: (NOTE)  The FDA has indicated that no capillary point of care blood glucose  monitoring systems are approved for use in \"critically ill\" patients,  however they have not defined this population. The College of  American Pathologists has recommended that these devices should not  be used in cases such as severe hypotension, dehydration, shock, and  hyperglycemic-hyperosmolar state, amongst others.  Venous or arterial  collection is the recommended specimen for testing these patients.    Performed by:                                 Date: 02/15/2024  Value: TEN Humphreyetra   PCT                       Status: Final  ------------    Radiology last 7 days:  No results found.     [unfilled]    Discharge Medications    Current Discharge Medication List    START taking these medications    aspirin 81 MG EC tablet  Take 1 tablet by mouth daily  Qty: 90 tablet Refills: 3    rosuvastatin (CRESTOR) 40 MG tablet  Take 1 tablet by mouth

## 2024-02-16 NOTE — CARDIO/PULMONARY
Cardiac Rehab: Spoke with wife as pt is unavailable with nursing staff. Explained cardiac rehab program and that we will be in contact with pt next week by phone for program enrollment and cardiac rehab information is on his AVS. She had no questions.

## 2024-02-16 NOTE — PLAN OF CARE
Problem: Pain  Goal: Verbalizes/displays adequate comfort level or baseline comfort level  2/15/2024 2032 by Jennifer Rose RN  Outcome: Progressing  2/15/2024 2032 by Jennifer Rose RN  Outcome: Progressing  Flowsheets  Taken 2/15/2024 2000 by Jennifer Rose RN  Verbalizes/displays adequate comfort level or baseline comfort level: Encourage patient to monitor pain and request assistance  Taken 2/15/2024 1337 by Santa Chavez RN  Verbalizes/displays adequate comfort level or baseline comfort level:   Encourage patient to monitor pain and request assistance   Assess pain using appropriate pain scale     Problem: Discharge Planning  Goal: Discharge to home or other facility with appropriate resources  2/15/2024 2032 by Jennifer Rose RN  Outcome: Progressing  2/15/2024 2032 by Jennifer Rose RN  Outcome: Progressing  Flowsheets  Taken 2/15/2024 2000 by Jennifer Rose RN  Discharge to home or other facility with appropriate resources: Identify barriers to discharge with patient and caregiver  Taken 2/15/2024 1337 by Santa Chavez RN  Discharge to home or other facility with appropriate resources:   Identify barriers to discharge with patient and caregiver   Arrange for needed discharge resources and transportation as appropriate     Problem: Safety - Adult  Goal: Free from fall injury  2/15/2024 2032 by Jennifer Rose RN  Outcome: Progressing  2/15/2024 2032 by Jennifer Rose RN  Outcome: Progressing     Problem: Chronic Conditions and Co-morbidities  Goal: Patient's chronic conditions and co-morbidity symptoms are monitored and maintained or improved  Outcome: Progressing

## 2024-02-16 NOTE — PROGRESS NOTES
4 Eyes Skin Assessment     NAME:  Avery Mccartney  YOB: 1970  MEDICAL RECORD NUMBER:  [id number]    The patient is being assessed for  Admission    I agree that at least one RN has performed a thorough Head to Toe Skin Assessment on the patient. ALL assessment sites listed below have been assessed.      Areas assessed by both nurses:    Head, Face, Ears, Shoulders, Back, Chest, Arms, Elbows, Hands, Sacrum. Buttock, Coccyx, Ischium, and Legs. Feet and Heels        Does the Patient have a Wound? No noted wound(s)       Griffin Prevention initiated by RN: Yes  Wound Care Orders initiated by RN: No    Pressure Injury (Stage 3,4, Unstageable, DTI, NWPT, and Complex wounds) if present, place Wound referral order by RN under : No    New Ostomies, if present place, Ostomy referral order under : No     Nurse 1 eSignature: Electronically signed by Jennifer Rose RN on 2/15/24 at 8:13 PM EST    **SHARE this note so that the co-signing nurse can place an eSignature**    Nurse 2 eSignature: Electronically signed by GUILLERMO SCOTT RN on 2/15/24 at 8:14 PM EST

## 2024-02-16 NOTE — PROGRESS NOTES
0730: Bedside and Verbal shift change report given to REGI Rojas (oncoming nurse) by REGI Rodriguez (offgoing nurse). Report included the following information Nurse Handoff Report, Index, Adult Overview, MAR, Recent Results, and Cardiac Rhythm NSR / SB .     0800: Echo tech present at bedside.     1000: Discharge instructions, medication education, and follow-up appointments given to patient. Pt verbalized understanding of all discharge instructions. Pt discharged with all belongings including stent cards and brilinta coupon.    RN provided time for clarification on questions and/ or concerns.     Pt verbalized and acknowledged education provided, pt denies additional questions and/ or concerns at this time.            Care Plan:   Problem: Pain  Goal: Verbalizes/displays adequate comfort level or baseline comfort level  2/16/2024 0748 by Nancie Bhagat RN  Outcome: Progressing  2/15/2024 2032 by Jennifer Rose RN  Outcome: Progressing  2/15/2024 2032 by Jennifer Rose RN  Outcome: Progressing  Flowsheets  Taken 2/15/2024 2000 by Jennifer Rose RN  Verbalizes/displays adequate comfort level or baseline comfort level: Encourage patient to monitor pain and request assistance  Taken 2/15/2024 1337 by Santa Chavez RN  Verbalizes/displays adequate comfort level or baseline comfort level:   Encourage patient to monitor pain and request assistance   Assess pain using appropriate pain scale     Problem: Discharge Planning  Goal: Discharge to home or other facility with appropriate resources  2/16/2024 0748 by Nancie Bhagat RN  Outcome: Progressing  2/15/2024 2032 by Jennifer Rose RN  Outcome: Progressing  2/15/2024 2032 by Jennifer Rose RN  Outcome: Progressing  Flowsheets  Taken 2/15/2024 2000 by Jennifer Rose RN  Discharge to home or other facility with appropriate resources: Identify barriers to discharge with patient and caregiver  Taken 2/15/2024 1337 by Santa Chavez RN  Discharge to home or other

## 2024-02-16 NOTE — DISCHARGE INSTRUCTIONS
Coronary Angioplasty: What to Expect at Home  Your Recovery     Coronary angioplasty is a procedure that is used to open a narrowed or blocked coronary artery. It may also be called a percutaneous coronary intervention (PCI). The doctor opened your narrowed or blocked artery by putting a thin tube, called a catheter, into your heart through a blood vessel. The catheter was inserted into the blood vessel in your groin or wrist. The doctor may have placed a small tube, called a stent, in the artery.  Your groin or wrist may have a bruise and feel sore for a few days after the procedure. You can do light activities around the house. But don't do anything strenuous until your doctor says it is okay. This may be for several days.  This care sheet gives you a general idea about how long it will take for you to recover. But each person recovers at a different pace. Follow the steps below to get better as quickly as possible.  How can you care for yourself at home?  Activity    If the doctor gave you a sedative:  For 24 hours, don't do anything that requires attention to detail, such as going to work, making important decisions, or signing any legal documents. It takes time for the medicine's effects to completely wear off.  For your safety, do not drive or operate any machinery that could be dangerous. Wait until the medicine wears off and you can think clearly and react easily.     Do not do strenuous exercise and do not lift, pull, or push anything heavy until your doctor says it is okay. This may be for several days. You can walk around the house and do light activity, such as cooking.     If the catheter was placed in your groin, try not to walk up stairs for the first couple of days.     If the catheter was placed in your arm near your wrist, do not bend your wrist deeply for the first couple of days. Be careful using your hand to get into and out of a chair or bed.     Carry your stent identification card with  cold, numb, or tingly.   Watch closely for changes in your health, and be sure to contact your doctor if you have any problems.  Where can you learn more?  Go to https://www.[facility].net/patientEd and enter Q672 to learn more about \"Coronary Angioplasty: What to Expect at Home.\"  Current as of: June 25, 2023               Content Version: 13.9  © 2006-2023 [employer].   Care instructions adapted under license by [employer]. If you have questions about a medical condition or this instruction, always ask your healthcare professional. [employer] disclaims any warranty or liability for your use of this information.

## 2024-03-12 ENCOUNTER — APPOINTMENT (OUTPATIENT)
Facility: HOSPITAL | Age: 54
End: 2024-03-12
Attending: INTERNAL MEDICINE
Payer: COMMERCIAL

## 2024-03-12 ENCOUNTER — HOSPITAL ENCOUNTER (OUTPATIENT)
Facility: HOSPITAL | Age: 54
Setting detail: RECURRING SERIES
Discharge: HOME | End: 2024-03-15
Attending: INTERNAL MEDICINE
Payer: COMMERCIAL

## 2024-03-12 VITALS
HEART RATE: 78 BPM | BODY MASS INDEX: 21.15 KG/M2 | RESPIRATION RATE: 18 BRPM | WEIGHT: 153.8 LBS | OXYGEN SATURATION: 99 %

## 2024-03-12 PROCEDURE — 93798 PHYS/QHP OP CAR RHAB W/ECG: CPT

## 2024-03-12 PROCEDURE — 93797 PHYS/QHP OP CAR RHAB WO ECG: CPT

## 2024-03-12 ASSESSMENT — EXERCISE STRESS TEST
PEAK_BP: 130/74
PEAK_METS: 2.3
PEAK_HR: 91
PEAK_RPE: 8
PEAK_HR: 91
PEAK_BP: 130/74
PEAK_BP: 130/74
PEAK_RPE: 8

## 2024-03-12 ASSESSMENT — PATIENT HEALTH QUESTIONNAIRE - PHQ9
9. THOUGHTS THAT YOU WOULD BE BETTER OFF DEAD, OR OF HURTING YOURSELF: 0
SUM OF ALL RESPONSES TO PHQ QUESTIONS 1-9: 6
SUM OF ALL RESPONSES TO PHQ QUESTIONS 1-9: 6
SUM OF ALL RESPONSES TO PHQ9 QUESTIONS 1 & 2: 2
SUM OF ALL RESPONSES TO PHQ QUESTIONS 1-9: 6
6. FEELING BAD ABOUT YOURSELF - OR THAT YOU ARE A FAILURE OR HAVE LET YOURSELF OR YOUR FAMILY DOWN: 0
8. MOVING OR SPEAKING SO SLOWLY THAT OTHER PEOPLE COULD HAVE NOTICED. OR THE OPPOSITE, BEING SO FIGETY OR RESTLESS THAT YOU HAVE BEEN MOVING AROUND A LOT MORE THAN USUAL: 0
4. FEELING TIRED OR HAVING LITTLE ENERGY: 1
SUM OF ALL RESPONSES TO PHQ QUESTIONS 1-9: 6
5. POOR APPETITE OR OVEREATING: 1
10. IF YOU CHECKED OFF ANY PROBLEMS, HOW DIFFICULT HAVE THESE PROBLEMS MADE IT FOR YOU TO DO YOUR WORK, TAKE CARE OF THINGS AT HOME, OR GET ALONG WITH OTHER PEOPLE: 0
1. LITTLE INTEREST OR PLEASURE IN DOING THINGS: 1
7. TROUBLE CONCENTRATING ON THINGS, SUCH AS READING THE NEWSPAPER OR WATCHING TELEVISION: 0
3. TROUBLE FALLING OR STAYING ASLEEP: 2
2. FEELING DOWN, DEPRESSED OR HOPELESS: 1

## 2024-03-12 ASSESSMENT — EJECTION FRACTION
EF_VALUE: 55
EF_VALUE: -5

## 2024-03-12 ASSESSMENT — LIFESTYLE VARIABLES
ALCOHOL_USE: WEEKLY
ALCOHOL_TYPE: BEER
ALCOHOL_AMOUNT: 5
CIGARETTES_PER_DAY: 10
SMOKELESS_TOBACCO: NO

## 2024-03-12 NOTE — CARDIO/PULMONARY
patient and reviewed. Patient will attend cardiac rehab 2-3 times/week which will include both exercise and education sessions.    Intake Start Time: 0900  Intake End Time: 1100    Elaina Llanos RN

## 2024-03-14 ENCOUNTER — APPOINTMENT (OUTPATIENT)
Facility: HOSPITAL | Age: 54
End: 2024-03-14
Attending: INTERNAL MEDICINE
Payer: COMMERCIAL

## 2024-03-18 ENCOUNTER — HOSPITAL ENCOUNTER (OUTPATIENT)
Facility: HOSPITAL | Age: 54
Setting detail: RECURRING SERIES
Discharge: HOME | End: 2024-03-21
Attending: INTERNAL MEDICINE
Payer: COMMERCIAL

## 2024-03-18 VITALS — BODY MASS INDEX: 21.48 KG/M2 | WEIGHT: 156.2 LBS

## 2024-03-18 PROCEDURE — 93798 PHYS/QHP OP CAR RHAB W/ECG: CPT

## 2024-03-18 ASSESSMENT — EXERCISE STRESS TEST
PEAK_METS: 2.3
PEAK_RPE: 11

## 2024-03-19 ENCOUNTER — APPOINTMENT (OUTPATIENT)
Facility: HOSPITAL | Age: 54
End: 2024-03-19
Attending: INTERNAL MEDICINE
Payer: COMMERCIAL

## 2024-03-21 ENCOUNTER — HOSPITAL ENCOUNTER (OUTPATIENT)
Facility: HOSPITAL | Age: 54
Setting detail: RECURRING SERIES
Discharge: HOME | End: 2024-03-24
Attending: INTERNAL MEDICINE
Payer: COMMERCIAL

## 2024-03-21 VITALS — WEIGHT: 157.6 LBS | BODY MASS INDEX: 21.67 KG/M2

## 2024-03-21 PROCEDURE — 93798 PHYS/QHP OP CAR RHAB W/ECG: CPT

## 2024-03-21 ASSESSMENT — EXERCISE STRESS TEST
PEAK_METS: 2.4
PEAK_RPE: 12

## 2024-03-26 ENCOUNTER — APPOINTMENT (OUTPATIENT)
Facility: HOSPITAL | Age: 54
End: 2024-03-26
Attending: INTERNAL MEDICINE
Payer: COMMERCIAL

## 2024-03-28 ENCOUNTER — HOSPITAL ENCOUNTER (OUTPATIENT)
Facility: HOSPITAL | Age: 54
Setting detail: RECURRING SERIES
Discharge: HOME | End: 2024-03-31
Attending: INTERNAL MEDICINE
Payer: COMMERCIAL

## 2024-03-28 VITALS — BODY MASS INDEX: 21.34 KG/M2 | WEIGHT: 155.2 LBS

## 2024-03-28 PROCEDURE — 93798 PHYS/QHP OP CAR RHAB W/ECG: CPT

## 2024-03-28 ASSESSMENT — EXERCISE STRESS TEST
PEAK_RPE: 12
PEAK_METS: 2.4

## 2024-04-01 ENCOUNTER — HOSPITAL ENCOUNTER (OUTPATIENT)
Facility: HOSPITAL | Age: 54
Setting detail: RECURRING SERIES
Discharge: HOME | End: 2024-04-04
Attending: INTERNAL MEDICINE
Payer: COMMERCIAL

## 2024-04-01 VITALS — WEIGHT: 157 LBS | BODY MASS INDEX: 21.59 KG/M2

## 2024-04-01 PROCEDURE — 93798 PHYS/QHP OP CAR RHAB W/ECG: CPT

## 2024-04-01 PROCEDURE — 93797 PHYS/QHP OP CAR RHAB WO ECG: CPT

## 2024-04-01 ASSESSMENT — LIFESTYLE VARIABLES
SMOKELESS_TOBACCO: NO
ALCOHOL_TYPE: BEER
ALCOHOL_USE: WEEKLY
CIGARETTES_PER_DAY: 10
ALCOHOL_AMOUNT: 5

## 2024-04-01 ASSESSMENT — EXERCISE STRESS TEST
PEAK_METS: 3.8
PEAK_RPE: 12

## 2024-04-01 ASSESSMENT — EJECTION FRACTION: EF_VALUE: 55

## 2024-04-01 NOTE — CARDIO/PULMONARY
Cardiac Rehab Nutrition Assessment - 1:1 Evaluation           NAME: Avery Mccartney : 1970 AGE: 54 y.o.  GENDER: male  CARDIAC REHAB ADMITTING DIAGNOSIS: Stented coronary artery [Z95.5]    PROBLEM LIST:  Patient Active Problem List   Diagnosis    ADHD (attention deficit hyperactivity disorder)    Left groin pain    Recurrent left inguinal hernia    Tobacco abuse    Osteoarthrosis, localized, primary, knee    Seizure disorder (HCC)    GERD (gastroesophageal reflux disease)    Chest pain    STEMI (ST elevation myocardial infarction) ([facility])         LABS:   Lab Results   Component Value Date/Time    CHOL 221 02/15/2024 10:13 AM    HDL 47 02/15/2024 10:13 AM       No results found for: \"LABA1C\"     MEDICATIONS/SUPPLEMENTS:   Scheduled Meds:  Continuous Infusions:  PRN Meds:.  Prior to Admission medications    Medication Sig Start Date End Date Taking? Authorizing Provider   aspirin 81 MG EC tablet Take 1 tablet by mouth daily 24   Luisito Angelo MD   rosuvastatin (CRESTOR) 40 MG tablet Take 1 tablet by mouth daily  Patient taking differently: Take 0.5 tablets by mouth daily 24   Luisito Angelo MD   metoprolol tartrate (LOPRESSOR) 25 MG tablet Take 0.5 tablets by mouth 2 times daily 24   Luisito Angelo MD   ticagrelor (BRILINTA) 90 MG TABS tablet Take 1 tablet by mouth 2 times daily 24   Luisito Angelo MD   omeprazole (PRILOSEC) 10 MG delayed release capsule Take 1 capsule by mouth nightly GERD    Provider, MD Raul           ANTHROPOMETRICS:    Ht Readings from Last 1 Encounters:   02/15/24 1.816 m (5' 11.5\")      Wt Readings from Last 10 Encounters:   24 71.2 kg (157 lb)   24 70.4 kg (155 lb 3.2 oz)   24 71.5 kg (157 lb 9.6 oz)   24 70.9 kg (156 lb 3.2 oz)   24 69.8 kg (153 lb 12.8 oz)   24 68 kg (149 lb 14.6 oz)   24 73.9 kg (163 lb)       BMI Readings from Last 10 Encounters:   24 21.59 kg/m²   24 21.34 kg/m²

## 2024-04-02 ENCOUNTER — HOSPITAL ENCOUNTER (OUTPATIENT)
Facility: HOSPITAL | Age: 54
Setting detail: RECURRING SERIES
Discharge: HOME | End: 2024-04-05
Attending: INTERNAL MEDICINE
Payer: COMMERCIAL

## 2024-04-02 VITALS — WEIGHT: 156.6 LBS | BODY MASS INDEX: 21.54 KG/M2

## 2024-04-02 PROCEDURE — 93798 PHYS/QHP OP CAR RHAB W/ECG: CPT

## 2024-04-02 PROCEDURE — 93797 PHYS/QHP OP CAR RHAB WO ECG: CPT

## 2024-04-02 ASSESSMENT — EXERCISE STRESS TEST
PEAK_METS: 3.8
PEAK_RPE: 12

## 2024-04-09 ENCOUNTER — HOSPITAL ENCOUNTER (OUTPATIENT)
Facility: HOSPITAL | Age: 54
Setting detail: RECURRING SERIES
Discharge: HOME | End: 2024-04-12
Attending: INTERNAL MEDICINE
Payer: COMMERCIAL

## 2024-04-09 VITALS — BODY MASS INDEX: 22.11 KG/M2 | WEIGHT: 160.8 LBS

## 2024-04-09 PROCEDURE — 93797 PHYS/QHP OP CAR RHAB WO ECG: CPT

## 2024-04-09 PROCEDURE — 93798 PHYS/QHP OP CAR RHAB W/ECG: CPT

## 2024-04-09 ASSESSMENT — EXERCISE STRESS TEST
PEAK_RPE: 12
PEAK_METS: 3.8

## 2024-04-11 ENCOUNTER — HOSPITAL ENCOUNTER (OUTPATIENT)
Facility: HOSPITAL | Age: 54
Setting detail: RECURRING SERIES
Discharge: HOME | End: 2024-04-14
Attending: INTERNAL MEDICINE
Payer: COMMERCIAL

## 2024-04-11 VITALS — WEIGHT: 158 LBS | BODY MASS INDEX: 21.73 KG/M2

## 2024-04-11 PROCEDURE — 93798 PHYS/QHP OP CAR RHAB W/ECG: CPT

## 2024-04-11 ASSESSMENT — EXERCISE STRESS TEST
PEAK_RPE: 12
PEAK_METS: 3.8

## 2024-04-16 ENCOUNTER — HOSPITAL ENCOUNTER (OUTPATIENT)
Facility: HOSPITAL | Age: 54
Setting detail: RECURRING SERIES
Discharge: HOME | End: 2024-04-19
Attending: INTERNAL MEDICINE
Payer: COMMERCIAL

## 2024-04-16 VITALS — BODY MASS INDEX: 22 KG/M2 | WEIGHT: 160 LBS

## 2024-04-16 PROCEDURE — 93797 PHYS/QHP OP CAR RHAB WO ECG: CPT

## 2024-04-16 PROCEDURE — 93798 PHYS/QHP OP CAR RHAB W/ECG: CPT

## 2024-04-16 ASSESSMENT — EXERCISE STRESS TEST
PEAK_METS: 3.8
PEAK_RPE: 12

## 2024-04-18 ENCOUNTER — HOSPITAL ENCOUNTER (OUTPATIENT)
Facility: HOSPITAL | Age: 54
Setting detail: RECURRING SERIES
Discharge: HOME | End: 2024-04-21
Attending: INTERNAL MEDICINE
Payer: COMMERCIAL

## 2024-04-18 VITALS — BODY MASS INDEX: 22 KG/M2 | WEIGHT: 160 LBS

## 2024-04-18 PROCEDURE — 93798 PHYS/QHP OP CAR RHAB W/ECG: CPT

## 2024-04-18 ASSESSMENT — EXERCISE STRESS TEST
PEAK_RPE: 12
PEAK_METS: 3.8

## 2024-04-23 ENCOUNTER — HOSPITAL ENCOUNTER (OUTPATIENT)
Facility: HOSPITAL | Age: 54
Setting detail: RECURRING SERIES
Discharge: HOME | End: 2024-04-26
Attending: INTERNAL MEDICINE
Payer: COMMERCIAL

## 2024-04-23 VITALS — WEIGHT: 160.2 LBS | BODY MASS INDEX: 22.03 KG/M2

## 2024-04-23 PROCEDURE — 93797 PHYS/QHP OP CAR RHAB WO ECG: CPT

## 2024-04-23 PROCEDURE — 93798 PHYS/QHP OP CAR RHAB W/ECG: CPT

## 2024-04-23 ASSESSMENT — EJECTION FRACTION: EF_VALUE: 55

## 2024-04-23 ASSESSMENT — LIFESTYLE VARIABLES
ALCOHOL_USE: WEEKLY
CIGARETTES_PER_DAY: 4
SMOKELESS_TOBACCO: NO
ALCOHOL_TYPE: BEER
ALCOHOL_AMOUNT: 5

## 2024-04-23 ASSESSMENT — EXERCISE STRESS TEST
PEAK_METS: 3.8
PEAK_RPE: 12

## 2024-04-25 ENCOUNTER — APPOINTMENT (OUTPATIENT)
Facility: HOSPITAL | Age: 54
End: 2024-04-25
Attending: INTERNAL MEDICINE
Payer: COMMERCIAL

## 2024-04-30 ENCOUNTER — HOSPITAL ENCOUNTER (OUTPATIENT)
Facility: HOSPITAL | Age: 54
Setting detail: RECURRING SERIES
Discharge: HOME | End: 2024-05-03
Attending: INTERNAL MEDICINE
Payer: COMMERCIAL

## 2024-04-30 VITALS — WEIGHT: 158.2 LBS | BODY MASS INDEX: 21.76 KG/M2

## 2024-04-30 PROCEDURE — 93797 PHYS/QHP OP CAR RHAB WO ECG: CPT

## 2024-04-30 PROCEDURE — 93798 PHYS/QHP OP CAR RHAB W/ECG: CPT

## 2024-04-30 ASSESSMENT — EXERCISE STRESS TEST
PEAK_RPE: 12
PEAK_METS: 3.8

## 2024-05-02 ENCOUNTER — HOSPITAL ENCOUNTER (OUTPATIENT)
Facility: HOSPITAL | Age: 54
Setting detail: RECURRING SERIES
Discharge: HOME | End: 2024-05-05
Attending: INTERNAL MEDICINE
Payer: COMMERCIAL

## 2024-05-02 VITALS — BODY MASS INDEX: 22.03 KG/M2 | WEIGHT: 160.2 LBS

## 2024-05-02 PROCEDURE — 93798 PHYS/QHP OP CAR RHAB W/ECG: CPT

## 2024-05-02 ASSESSMENT — EXERCISE STRESS TEST
PEAK_RPE: 12
PEAK_METS: 3.8

## 2024-05-07 ENCOUNTER — APPOINTMENT (OUTPATIENT)
Facility: HOSPITAL | Age: 54
End: 2024-05-07
Attending: INTERNAL MEDICINE
Payer: COMMERCIAL

## 2024-05-09 ENCOUNTER — HOSPITAL ENCOUNTER (OUTPATIENT)
Facility: HOSPITAL | Age: 54
Setting detail: RECURRING SERIES
Discharge: HOME | End: 2024-05-12
Attending: INTERNAL MEDICINE
Payer: COMMERCIAL

## 2024-05-09 VITALS — WEIGHT: 160.8 LBS | BODY MASS INDEX: 22.11 KG/M2

## 2024-05-09 PROCEDURE — 93798 PHYS/QHP OP CAR RHAB W/ECG: CPT

## 2024-05-09 ASSESSMENT — EXERCISE STRESS TEST
PEAK_RPE: 12
PEAK_METS: 3.8

## 2024-05-14 ENCOUNTER — HOSPITAL ENCOUNTER (OUTPATIENT)
Facility: HOSPITAL | Age: 54
Setting detail: RECURRING SERIES
Discharge: HOME | End: 2024-05-17
Attending: INTERNAL MEDICINE
Payer: COMMERCIAL

## 2024-05-14 VITALS — WEIGHT: 160.8 LBS | BODY MASS INDEX: 22.11 KG/M2

## 2024-05-14 PROCEDURE — 93797 PHYS/QHP OP CAR RHAB WO ECG: CPT

## 2024-05-14 PROCEDURE — 93798 PHYS/QHP OP CAR RHAB W/ECG: CPT

## 2024-05-14 ASSESSMENT — LIFESTYLE VARIABLES
SMOKELESS_TOBACCO: NO
CIGARETTES_PER_DAY: 4
ALCOHOL_USE: WEEKLY
ALCOHOL_AMOUNT: 5
ALCOHOL_TYPE: BEER

## 2024-05-14 ASSESSMENT — EXERCISE STRESS TEST
PEAK_RPE: 12
PEAK_METS: 3.8

## 2024-05-14 ASSESSMENT — EJECTION FRACTION: EF_VALUE: 55

## 2024-05-16 ENCOUNTER — HOSPITAL ENCOUNTER (OUTPATIENT)
Facility: HOSPITAL | Age: 54
Setting detail: RECURRING SERIES
Discharge: HOME | End: 2024-05-19
Attending: INTERNAL MEDICINE
Payer: COMMERCIAL

## 2024-05-16 VITALS — WEIGHT: 160.2 LBS | BODY MASS INDEX: 22.03 KG/M2

## 2024-05-16 PROCEDURE — 93798 PHYS/QHP OP CAR RHAB W/ECG: CPT

## 2024-05-16 ASSESSMENT — EXERCISE STRESS TEST
PEAK_METS: 4.3
PEAK_RPE: 12

## 2024-05-21 ENCOUNTER — HOSPITAL ENCOUNTER (OUTPATIENT)
Facility: HOSPITAL | Age: 54
Setting detail: RECURRING SERIES
Discharge: HOME | End: 2024-05-24
Attending: INTERNAL MEDICINE
Payer: COMMERCIAL

## 2024-05-21 VITALS — BODY MASS INDEX: 22.03 KG/M2 | WEIGHT: 160.2 LBS

## 2024-05-21 PROCEDURE — 93797 PHYS/QHP OP CAR RHAB WO ECG: CPT

## 2024-05-21 PROCEDURE — 93798 PHYS/QHP OP CAR RHAB W/ECG: CPT

## 2024-05-21 ASSESSMENT — EXERCISE STRESS TEST
PEAK_RPE: 12
PEAK_METS: 4.3

## 2024-05-23 ENCOUNTER — HOSPITAL ENCOUNTER (OUTPATIENT)
Facility: HOSPITAL | Age: 54
Setting detail: RECURRING SERIES
Discharge: HOME | End: 2024-05-26
Attending: INTERNAL MEDICINE
Payer: COMMERCIAL

## 2024-05-23 VITALS — WEIGHT: 159.8 LBS | BODY MASS INDEX: 21.98 KG/M2

## 2024-05-23 PROCEDURE — 93798 PHYS/QHP OP CAR RHAB W/ECG: CPT

## 2024-05-23 ASSESSMENT — EXERCISE STRESS TEST
PEAK_METS: 4.3
PEAK_RPE: 12

## 2024-05-28 ENCOUNTER — APPOINTMENT (OUTPATIENT)
Facility: HOSPITAL | Age: 54
End: 2024-05-28
Attending: INTERNAL MEDICINE
Payer: COMMERCIAL

## 2024-05-30 ENCOUNTER — APPOINTMENT (OUTPATIENT)
Facility: HOSPITAL | Age: 54
End: 2024-05-30
Attending: INTERNAL MEDICINE
Payer: COMMERCIAL

## 2024-05-30 ENCOUNTER — HOSPITAL ENCOUNTER (OUTPATIENT)
Facility: HOSPITAL | Age: 54
Setting detail: RECURRING SERIES
End: 2024-05-30
Attending: INTERNAL MEDICINE
Payer: COMMERCIAL

## 2024-05-30 VITALS — WEIGHT: 160.2 LBS | BODY MASS INDEX: 22.03 KG/M2

## 2024-05-30 PROCEDURE — 93798 PHYS/QHP OP CAR RHAB W/ECG: CPT

## 2024-05-30 ASSESSMENT — EXERCISE STRESS TEST
PEAK_RPE: 12
PEAK_METS: 4.3

## 2024-06-04 ENCOUNTER — HOSPITAL ENCOUNTER (OUTPATIENT)
Facility: HOSPITAL | Age: 54
Setting detail: RECURRING SERIES
Discharge: HOME | End: 2024-06-07
Attending: INTERNAL MEDICINE
Payer: COMMERCIAL

## 2024-06-04 VITALS — BODY MASS INDEX: 21.83 KG/M2 | WEIGHT: 158.7 LBS

## 2024-06-04 PROCEDURE — 93798 PHYS/QHP OP CAR RHAB W/ECG: CPT

## 2024-06-04 PROCEDURE — 93797 PHYS/QHP OP CAR RHAB WO ECG: CPT

## 2024-06-04 ASSESSMENT — LIFESTYLE VARIABLES
ALCOHOL_TYPE: BEER
ALCOHOL_AMOUNT: 5
SMOKELESS_TOBACCO: NO
CIGARETTES_PER_DAY: 6
ALCOHOL_USE: WEEKLY

## 2024-06-04 ASSESSMENT — EJECTION FRACTION: EF_VALUE: 55

## 2024-06-04 ASSESSMENT — EXERCISE STRESS TEST
PEAK_RPE: 12
PEAK_METS: 4.3

## 2024-06-06 ENCOUNTER — HOSPITAL ENCOUNTER (OUTPATIENT)
Facility: HOSPITAL | Age: 54
Setting detail: RECURRING SERIES
Discharge: HOME | End: 2024-06-09
Attending: INTERNAL MEDICINE
Payer: COMMERCIAL

## 2024-06-06 ENCOUNTER — APPOINTMENT (OUTPATIENT)
Facility: HOSPITAL | Age: 54
End: 2024-06-06
Attending: INTERNAL MEDICINE
Payer: COMMERCIAL

## 2024-06-06 VITALS — WEIGHT: 157.8 LBS | BODY MASS INDEX: 21.7 KG/M2

## 2024-06-06 PROCEDURE — 93798 PHYS/QHP OP CAR RHAB W/ECG: CPT

## 2024-06-06 ASSESSMENT — EXERCISE STRESS TEST
PEAK_RPE: 12
PEAK_METS: 4.3

## 2024-06-11 ENCOUNTER — APPOINTMENT (OUTPATIENT)
Facility: HOSPITAL | Age: 54
End: 2024-06-11
Attending: INTERNAL MEDICINE
Payer: COMMERCIAL

## 2024-06-11 ENCOUNTER — HOSPITAL ENCOUNTER (OUTPATIENT)
Facility: HOSPITAL | Age: 54
Setting detail: RECURRING SERIES
Discharge: HOME | End: 2024-06-14
Attending: INTERNAL MEDICINE
Payer: COMMERCIAL

## 2024-06-11 VITALS — BODY MASS INDEX: 21.73 KG/M2 | WEIGHT: 158 LBS

## 2024-06-11 PROCEDURE — 93798 PHYS/QHP OP CAR RHAB W/ECG: CPT

## 2024-06-11 PROCEDURE — 93797 PHYS/QHP OP CAR RHAB WO ECG: CPT

## 2024-06-11 ASSESSMENT — EXERCISE STRESS TEST
PEAK_RPE: 12
PEAK_METS: 4.3

## 2024-06-13 ENCOUNTER — APPOINTMENT (OUTPATIENT)
Facility: HOSPITAL | Age: 54
End: 2024-06-13
Attending: INTERNAL MEDICINE
Payer: COMMERCIAL

## 2024-06-13 ENCOUNTER — HOSPITAL ENCOUNTER (OUTPATIENT)
Facility: HOSPITAL | Age: 54
Setting detail: RECURRING SERIES
Discharge: HOME | End: 2024-06-16
Attending: INTERNAL MEDICINE
Payer: COMMERCIAL

## 2024-06-13 VITALS — BODY MASS INDEX: 21.89 KG/M2 | WEIGHT: 159.2 LBS

## 2024-06-13 PROCEDURE — 93798 PHYS/QHP OP CAR RHAB W/ECG: CPT

## 2024-06-13 ASSESSMENT — EXERCISE STRESS TEST
PEAK_METS: 4.3
PEAK_RPE: 12

## 2024-06-13 ASSESSMENT — PATIENT HEALTH QUESTIONNAIRE - PHQ9
7. TROUBLE CONCENTRATING ON THINGS, SUCH AS READING THE NEWSPAPER OR WATCHING TELEVISION: NOT AT ALL
SUM OF ALL RESPONSES TO PHQ QUESTIONS 1-9: 4
1. LITTLE INTEREST OR PLEASURE IN DOING THINGS: NOT AT ALL
SUM OF ALL RESPONSES TO PHQ QUESTIONS 1-9: 4
5. POOR APPETITE OR OVEREATING: NOT AT ALL
4. FEELING TIRED OR HAVING LITTLE ENERGY: NOT AT ALL
6. FEELING BAD ABOUT YOURSELF - OR THAT YOU ARE A FAILURE OR HAVE LET YOURSELF OR YOUR FAMILY DOWN: SEVERAL DAYS
SUM OF ALL RESPONSES TO PHQ QUESTIONS 1-9: 4
2. FEELING DOWN, DEPRESSED OR HOPELESS: SEVERAL DAYS
SUM OF ALL RESPONSES TO PHQ QUESTIONS 1-9: 4
3. TROUBLE FALLING OR STAYING ASLEEP: MORE THAN HALF THE DAYS
SUM OF ALL RESPONSES TO PHQ9 QUESTIONS 1 & 2: 1
10. IF YOU CHECKED OFF ANY PROBLEMS, HOW DIFFICULT HAVE THESE PROBLEMS MADE IT FOR YOU TO DO YOUR WORK, TAKE CARE OF THINGS AT HOME, OR GET ALONG WITH OTHER PEOPLE: NOT DIFFICULT AT ALL
9. THOUGHTS THAT YOU WOULD BE BETTER OFF DEAD, OR OF HURTING YOURSELF: NOT AT ALL

## 2024-06-18 ENCOUNTER — APPOINTMENT (OUTPATIENT)
Facility: HOSPITAL | Age: 54
End: 2024-06-18
Attending: INTERNAL MEDICINE
Payer: COMMERCIAL

## 2024-06-18 ENCOUNTER — HOSPITAL ENCOUNTER (OUTPATIENT)
Facility: HOSPITAL | Age: 54
Setting detail: RECURRING SERIES
Discharge: HOME | End: 2024-06-21
Attending: INTERNAL MEDICINE
Payer: COMMERCIAL

## 2024-06-18 VITALS — BODY MASS INDEX: 21.84 KG/M2 | WEIGHT: 158.8 LBS

## 2024-06-18 PROCEDURE — 93798 PHYS/QHP OP CAR RHAB W/ECG: CPT

## 2024-06-18 ASSESSMENT — EXERCISE STRESS TEST
PEAK_METS: 4.3
PEAK_RPE: 12

## 2024-06-20 ENCOUNTER — HOSPITAL ENCOUNTER (OUTPATIENT)
Facility: HOSPITAL | Age: 54
Setting detail: RECURRING SERIES
Discharge: HOME | End: 2024-06-23
Attending: INTERNAL MEDICINE
Payer: COMMERCIAL

## 2024-06-20 VITALS — BODY MASS INDEX: 21.95 KG/M2 | WEIGHT: 159.6 LBS

## 2024-06-20 PROCEDURE — 93798 PHYS/QHP OP CAR RHAB W/ECG: CPT

## 2024-06-20 ASSESSMENT — EXERCISE STRESS TEST
PEAK_RPE: 12
PEAK_RPE: 12
PEAK_BP: 120/62
PEAK_HR: 108
PEAK_BP: 120/62
PEAK_HR: 108
PEAK_BP: 120/62
PEAK_METS: 4.3

## 2024-06-20 ASSESSMENT — LIFESTYLE VARIABLES
ALCOHOL_TYPE: BEER
CIGARETTES_PER_DAY: 6
SMOKELESS_TOBACCO: NO
ALCOHOL_AMOUNT: 5
ALCOHOL_USE: WEEKLY

## 2024-06-20 ASSESSMENT — EJECTION FRACTION: EF_VALUE: 55

## 2024-06-20 NOTE — CARDIO/PULMONARY
DISCHARGE SUMMARY NOTE  2024      NAME: Avery Mccartney : 1970 AGE: 54 y.o.  GENDER: male    CARDIAC REHAB ADMITTING DIAGNOSIS: Stented coronary artery [Z95.5]    REFERRING PHYSICIAN: Luisito Angelo MD    MEDICAL HX:  Past Medical History:   Diagnosis Date    CAD (coronary artery disease)     GERD (gastroesophageal reflux disease)     Heat stroke     Hyperlipidemia     Left groin pain 2012    MI (myocardial infarction) (HCC)     Pleurisy     Pneumothorax on right 2004    Psychiatric disorder     ADHD    Recurrent left inguinal hernia 2012    Seizures (HCC)        LABS:     No results found for: \"HBA1C\", \"[id number]\"  Lab Results   Component Value Date/Time    CHOL 221 02/15/2024 10:13 AM    HDL 47 02/15/2024 10:13 AM    .6 02/15/2024 10:13 AM    VLDL 28.4 02/15/2024 10:13 AM         ANTHROPOMETRICS:      Ht Readings from Last 1 Encounters:   02/15/24 1.816 m (5' 11.5\")      Wt Readings from Last 1 Encounters:   24 72.4 kg (159 lb 9.6 oz)        WAIST: 34          PROGRAM SUMMARY:    Avery Mccartney completed 36 sessions in phase II of the Cardiac Rehab program. Patient walked 0.24 mi at a speed of 2.5 mph and grade of 1 % for a final MET level of 4.3. Avery Mccartney plans to continue exercising at home and/or a gym and has set revised goals that include 30 minutes of moderate-intensity aerobic activity 3-5 days weekly.      MET level increased from 2.3 at intake to 4.3 at discharge. Avery Mccartney will focus on diet and nutrition at home and has received individualized healthy nutrition recommendations from [facility] Cardiac Rehab staff.  They are aware of their cardiac disease risk factors and cardiac medications. All questions were addressed and discharge plans discussed. Avery Mccartney verbalized understanding.      RODOLFO Hernandez  2024

## (undated) DEVICE — ANGIOGRAPHY KIT

## (undated) DEVICE — KIT MED IMAG CNTRST AGNT W/ IOPAMIDOL REUSE

## (undated) DEVICE — KIT HND CTRL 3 W STPCOCK ROT END 54IN PREM HI PRSS TBNG AT

## (undated) DEVICE — CATH BLLN ANGIO 2.75X12MM SC EUPHORA RX

## (undated) DEVICE — PADPRO DEFIBRILLATION/PACING/CARDIOVERSION/MONITORING ELECTRODES, ADULT/CHILD GREATER THAN 10 KG RADIOTRANSPARENT ELECTRODE, PHYSIO-CONTROL QUIK-COMBO (M) 60" (152 CM): Brand: PADPRO

## (undated) DEVICE — CATH BLLN ANGIO 2.50X12MM SC EUPHORA RX

## (undated) DEVICE — SPECIAL PROCEDURE DRAPE 32" X 34": Brand: SPECIAL PROCEDURE DRAPE

## (undated) DEVICE — Device: Brand: EAGLE EYE PLATINUM RX DIGITAL IVUS CATHETER

## (undated) DEVICE — WASTE KIT - ST MARY: Brand: MEDLINE INDUSTRIES, INC.

## (undated) DEVICE — CUSTOM KT PTCA INFL DEV K05 00052M

## (undated) DEVICE — CATHETER DIAG 5FR L100CM LUMN ID0.047IN JL3.5 CRV 0 SIDE H

## (undated) DEVICE — CATHETER ANGIO JR4 AD 5 FRX100 CM 25 CM PERFORMA

## (undated) DEVICE — TR BAND RADIAL ARTERY COMPRESSION DEVICE: Brand: TR BAND

## (undated) DEVICE — CATH BLLN ANGIO 3.50X12MM SC EUPHORA RX

## (undated) DEVICE — KIT MFLD ISOLATN NACL CNTRST PRT TBNG SPIK W/ PRSS TRNSDUC

## (undated) DEVICE — COPILOT BLEEDBACK CONTROL VALVE: Brand: COPILOT

## (undated) DEVICE — PACK PROCEDURE SURG HRT CATH

## (undated) DEVICE — CATHETER GUID 6FR L100CM DIA0.071IN NYL SHFT EBU3.5

## (undated) DEVICE — GLIDESHEATH SLENDER ACCESS KIT: Brand: GLIDESHEATH SLENDER

## (undated) DEVICE — KIT AT-X65 ANGIOTOUCH HAND CONTROLLER

## (undated) DEVICE — CATH BLLN ANGIO 2X15MM SC EUPHORA RX

## (undated) DEVICE — SPLINT WR VELC FOAM NEUT POS DISP FOR RAD ART ACC SFT STRP

## (undated) DEVICE — HT BALANCE MIDDLEWEIGHT ELITE GUIDE WIRE .014" 3 CM STRAIGHT TIP 190 CM: Brand: HI-TORQUE BALANCE MIDDLEWEIGHT ELITE

## (undated) DEVICE — CATH BLLN ANGIO 2X12MM SC EUPHORA RX

## (undated) DEVICE — PINNACLE INTRODUCER SHEATH: Brand: PINNACLE

## (undated) DEVICE — Device: Brand: PROWATER

## (undated) DEVICE — 3M™ TEGADERM™ TRANSPARENT FILM DRESSING FRAME STYLE, 1626W, 4 IN X 4-3/4 IN (10 CM X 12 CM), 50/CT 4CT/CASE: Brand: 3M™ TEGADERM™

## (undated) DEVICE — GLIDESHEATH SLENDER STAINLESS STEEL KIT: Brand: GLIDESHEATH SLENDER

## (undated) DEVICE — RUNTHROUGH NS EXTRA FLOPPY PTCA GUIDEWIRE: Brand: RUNTHROUGH